# Patient Record
Sex: MALE | Race: WHITE | NOT HISPANIC OR LATINO | Employment: OTHER | URBAN - METROPOLITAN AREA
[De-identification: names, ages, dates, MRNs, and addresses within clinical notes are randomized per-mention and may not be internally consistent; named-entity substitution may affect disease eponyms.]

---

## 2017-03-24 ENCOUNTER — HOSPITAL ENCOUNTER (EMERGENCY)
Facility: HOSPITAL | Age: 82
Discharge: ANOTHER HOSPITAL | End: 2017-03-24
Attending: EMERGENCY MEDICINE
Payer: MEDICARE

## 2017-03-24 VITALS
SYSTOLIC BLOOD PRESSURE: 135 MMHG | OXYGEN SATURATION: 94 % | TEMPERATURE: 98 F | DIASTOLIC BLOOD PRESSURE: 73 MMHG | RESPIRATION RATE: 24 BRPM | HEART RATE: 84 BPM | HEIGHT: 70 IN

## 2017-03-24 DIAGNOSIS — I10 ESSENTIAL HYPERTENSION: ICD-10-CM

## 2017-03-24 DIAGNOSIS — J84.10 PULMONARY FIBROSIS, POSTINFLAMMATORY: ICD-10-CM

## 2017-03-24 DIAGNOSIS — Z95.0 PACEMAKER: ICD-10-CM

## 2017-03-24 DIAGNOSIS — I49.5 SICK SINUS SYNDROME: ICD-10-CM

## 2017-03-24 LAB
ALBUMIN SERPL BCP-MCNC: 4.1 G/DL
ALP SERPL-CCNC: 110 U/L
ALT SERPL W/O P-5'-P-CCNC: 23 U/L
AMYLASE SERPL-CCNC: 88 U/L
ANION GAP SERPL CALC-SCNC: 17 MMOL/L
AST SERPL-CCNC: 29 U/L
BASOPHILS # BLD AUTO: 0.02 K/UL
BASOPHILS NFR BLD: 0.1 %
BILIRUB SERPL-MCNC: 1.4 MG/DL
BUN SERPL-MCNC: 31 MG/DL
CALCIUM SERPL-MCNC: 9.6 MG/DL
CHLORIDE SERPL-SCNC: 103 MMOL/L
CO2 SERPL-SCNC: 16 MMOL/L
CREAT SERPL-MCNC: 1.4 MG/DL
DIFFERENTIAL METHOD: ABNORMAL
EOSINOPHIL # BLD AUTO: 0 K/UL
EOSINOPHIL NFR BLD: 0.1 %
ERYTHROCYTE [DISTWIDTH] IN BLOOD BY AUTOMATED COUNT: 14.8 %
EST. GFR  (AFRICAN AMERICAN): 53 ML/MIN/1.73 M^2
EST. GFR  (NON AFRICAN AMERICAN): 45.8 ML/MIN/1.73 M^2
GLUCOSE SERPL-MCNC: 125 MG/DL
HCT VFR BLD AUTO: 49.7 %
HGB BLD-MCNC: 16.3 G/DL
LIPASE SERPL-CCNC: 31 U/L
LYMPHOCYTES # BLD AUTO: 2.1 K/UL
LYMPHOCYTES NFR BLD: 11.3 %
MCH RBC QN AUTO: 30.8 PG
MCHC RBC AUTO-ENTMCNC: 32.8 %
MCV RBC AUTO: 94 FL
MONOCYTES # BLD AUTO: 1.5 K/UL
MONOCYTES NFR BLD: 8.2 %
NEUTROPHILS # BLD AUTO: 14.6 K/UL
NEUTROPHILS NFR BLD: 80 %
PLATELET # BLD AUTO: 228 K/UL
PMV BLD AUTO: 10 FL
POTASSIUM SERPL-SCNC: 4.6 MMOL/L
PROT SERPL-MCNC: 8.3 G/DL
RBC # BLD AUTO: 5.29 M/UL
SODIUM SERPL-SCNC: 136 MMOL/L
WBC # BLD AUTO: 18.27 K/UL

## 2017-03-24 PROCEDURE — 96365 THER/PROPH/DIAG IV INF INIT: CPT

## 2017-03-24 PROCEDURE — 80053 COMPREHEN METABOLIC PANEL: CPT

## 2017-03-24 PROCEDURE — 25000003 PHARM REV CODE 250: Performed by: EMERGENCY MEDICINE

## 2017-03-24 PROCEDURE — 99900035 HC TECH TIME PER 15 MIN (STAT)

## 2017-03-24 PROCEDURE — 93005 ELECTROCARDIOGRAM TRACING: CPT

## 2017-03-24 PROCEDURE — 82150 ASSAY OF AMYLASE: CPT

## 2017-03-24 PROCEDURE — C9113 INJ PANTOPRAZOLE SODIUM, VIA: HCPCS | Performed by: EMERGENCY MEDICINE

## 2017-03-24 PROCEDURE — 96376 TX/PRO/DX INJ SAME DRUG ADON: CPT

## 2017-03-24 PROCEDURE — 63600175 PHARM REV CODE 636 W HCPCS: Performed by: EMERGENCY MEDICINE

## 2017-03-24 PROCEDURE — 99285 EMERGENCY DEPT VISIT HI MDM: CPT | Mod: 25

## 2017-03-24 PROCEDURE — 85025 COMPLETE CBC W/AUTO DIFF WBC: CPT

## 2017-03-24 PROCEDURE — 82962 GLUCOSE BLOOD TEST: CPT

## 2017-03-24 PROCEDURE — 93010 ELECTROCARDIOGRAM REPORT: CPT | Mod: ,,, | Performed by: INTERNAL MEDICINE

## 2017-03-24 PROCEDURE — 96375 TX/PRO/DX INJ NEW DRUG ADDON: CPT

## 2017-03-24 PROCEDURE — 83690 ASSAY OF LIPASE: CPT

## 2017-03-24 PROCEDURE — 96361 HYDRATE IV INFUSION ADD-ON: CPT

## 2017-03-24 PROCEDURE — 25500020 PHARM REV CODE 255: Performed by: EMERGENCY MEDICINE

## 2017-03-24 RX ORDER — HYDROMORPHONE HYDROCHLORIDE 2 MG/ML
1 INJECTION, SOLUTION INTRAMUSCULAR; INTRAVENOUS; SUBCUTANEOUS ONCE
Status: COMPLETED | OUTPATIENT
Start: 2017-03-24 | End: 2017-03-24

## 2017-03-24 RX ORDER — ONDANSETRON 2 MG/ML
8 INJECTION INTRAMUSCULAR; INTRAVENOUS ONCE
Status: COMPLETED | OUTPATIENT
Start: 2017-03-24 | End: 2017-03-24

## 2017-03-24 RX ORDER — SODIUM CHLORIDE 9 MG/ML
1000 INJECTION, SOLUTION INTRAVENOUS CONTINUOUS
Status: DISCONTINUED | OUTPATIENT
Start: 2017-03-24 | End: 2017-03-24 | Stop reason: HOSPADM

## 2017-03-24 RX ORDER — PANTOPRAZOLE SODIUM 40 MG/10ML
40 INJECTION, POWDER, LYOPHILIZED, FOR SOLUTION INTRAVENOUS
Status: COMPLETED | OUTPATIENT
Start: 2017-03-24 | End: 2017-03-24

## 2017-03-24 RX ORDER — LORATADINE 10 MG/1
10 TABLET ORAL DAILY PRN
COMMUNITY
Start: 2016-05-30 | End: 2017-09-07

## 2017-03-24 RX ADMIN — HYDROMORPHONE HYDROCHLORIDE 1 MG: 2 INJECTION, SOLUTION INTRAMUSCULAR; INTRAVENOUS; SUBCUTANEOUS at 09:03

## 2017-03-24 RX ADMIN — SODIUM CHLORIDE 1000 ML: 0.9 INJECTION, SOLUTION INTRAVENOUS at 07:03

## 2017-03-24 RX ADMIN — HYDROMORPHONE HYDROCHLORIDE 1 MG: 2 INJECTION, SOLUTION INTRAMUSCULAR; INTRAVENOUS; SUBCUTANEOUS at 06:03

## 2017-03-24 RX ADMIN — PANTOPRAZOLE SODIUM 40 MG: 40 INJECTION, POWDER, FOR SOLUTION INTRAVENOUS at 08:03

## 2017-03-24 RX ADMIN — ONDANSETRON 8 MG: 2 INJECTION INTRAMUSCULAR; INTRAVENOUS at 06:03

## 2017-03-24 RX ADMIN — IOHEXOL 75 ML: 350 INJECTION, SOLUTION INTRAVENOUS at 08:03

## 2017-03-24 RX ADMIN — HYDROMORPHONE HYDROCHLORIDE 1 MG: 2 INJECTION, SOLUTION INTRAMUSCULAR; INTRAVENOUS; SUBCUTANEOUS at 07:03

## 2017-03-24 RX ADMIN — SODIUM CHLORIDE 1000 ML: 0.9 INJECTION, SOLUTION INTRAVENOUS at 09:03

## 2017-03-24 RX ADMIN — CEFEPIME 2 G: 2 INJECTION, POWDER, FOR SOLUTION INTRAMUSCULAR; INTRAVENOUS at 08:03

## 2017-03-24 NOTE — ED AVS SNAPSHOT
[facility]  [address] [address] [address]               Viral TREVINO Jordan Wilder   3/24/2017  6:24 PM   ED    Description:  Male : 1933   Department:  [facility]           Your Care was Coordinated By:     Provider Role From To    Dylon Martinez MD Attending Provider [phone] --      Reason for Visit     Abdominal Pain           Diagnoses this Visit        Comments    Accidental esophageal perforation during procedure    -  Primary     Pulmonary fibrosis, postinflammatory         Sick sinus syndrome         Pacemaker         Essential hypertension           ED Disposition     ED Disposition Condition Comment    Transfer to Another Facility  Viral Ortega Jr. should be transferred out to [facility] ED per Dr Amy Ferguson.           To Do List           [facility]     [facility] Care Line -  Assistance  Registered nurses in the [facility] provide clinical advisement, health education, appointment booking, and other advisory services.  Call for this free service at [phone].             Medications           These medications were administered today        Dose Freq    hydromorphone (PF) injection 1 mg 1 mg Once    Sig: Inject 0.5 mLs (1 mg total) into the vein once.    Class: Normal    Route: Intravenous    ondansetron injection 8 mg 8 mg Once    Sig: Inject 8 mg into the vein once.    Class: Normal    Route: Intravenous    sodium chloride 0.9% bolus 1,000 mL 1,000 mL Once    Sig: Inject 1,000 mLs into the vein once.    Class: Normal    Route: Intravenous    sodium chloride 0.9% bolus 1,000 mL 1,000 mL Once    Sig: Inject 1,000 mLs into the vein once.    Class: Normal    Route: Intravenous    hydromorphone (PF) injection 1 mg 1 mg Once    Sig: Inject 0.5 mLs (1 mg total) into the vein once.    Class: Normal    Route: Intravenous    omnipaque 350 iohexol 75 mL 75 mL IMG once as needed    Sig: Inject 75 mLs into the vein  ONCE PRN for contrast.    Class: Normal    Route: Intravenous    pantoprazole injection 40 mg 40 mg ED 1 Time    Sig: Inject 40 mg into the vein ED 1 Time.    Class: Normal    Route: Intravenous    cefepime 2 g in dextrose 5% 50 mL IVPB (ready to mix system) 2 g ED 1 Time    Sig: Inject 50 mLs (2 g total) into the vein ED 1 Time.    Class: Normal    Route: Intravenous      STOP taking these medications     fluocinonide (LIDEX) 0.05 % gel Apply  topically 2 (two) times daily.    metoprolol succinate (TOPROL-XL) 50 MG 24 hr tablet Take 25 mg by mouth once daily.    ketoconazole (NIZORAL) 2 % shampoo     iron &iron asp gly-FA-mv,min27 125 mg iron-25 mg iron-1 mg Tab 1 tablet.    clobetasol-emollient 0.05 % Crea     clobetasol (TEMOVATE) 0.05 % external solution     polyethylene glycol (GLYCOLAX) 17 gram/dose powder USE AS DIRECTED FOR COLONOSCOPY BOWEL PREP           Verify that the below list of medications is an accurate representation of the medications you are currently taking.  If none reported, the list may be blank. If incorrect, please contact your healthcare provider. Carry this list with you in case of emergency.           Current Medications     apixaban (ELIQUIS) 2.5 mg Tab Take 2.5 mg by mouth 2 (two) times daily.    flecainide (TAMBOCOR) 50 MG Tab Take 100 mg by mouth every 12 (twelve) hours.    gabapentin (NEURONTIN) 100 MG capsule Take 800 mg by mouth 3 (three) times daily.     ipratropium (ATROVENT) 0.06 % nasal spray 2 sprays by Nasal route 4 (four) times daily. As needed for rhinitis    loratadine (CLARITIN) 10 mg tablet Take 10 mg by mouth daily as needed.    midodrine (PROAMATINE) 5 MG Tab 10 mg.     spironolactone (ALDACTONE) 25 MG tablet Take 25 mg by mouth once daily.    tamsulosin (FLOMAX) 0.4 mg Cp24 Take 0.4 mg by mouth once daily.    cefepime 2 g in dextrose 5% 50 mL IVPB (ready to mix system) Inject 50 mLs (2 g total) into the vein ED 1 Time.           Clinical Reference Information          "  Your Vitals Were     BP Pulse Temp Resp Height SpO2    145/68 (BP Location: Right arm, Patient Position: Lying, BP Method: Automatic) 84 97.9 °F (36.6 °C) (Oral) 22 5' 10" (1.778 m) 92%      Allergies as of 3/24/2017        Reactions    Amoxicillin-pot Clavulanate     GI upset    Codeine     Diphenhydramine Hcl     hyper    Hydrocodone     Oxycodone-acetaminophen     Propoxyphene N-acetaminophen       Immunizations Administered on Date of Encounter - 3/24/2017     None      ED Micro, Lab, POCT     Start Ordered       Status Ordering Provider    03/24/17 1835 03/24/17 1834  Amylase  STAT      Final result     03/24/17 1835 03/24/17 1834  CBC auto differential  STAT      Final result     03/24/17 1835 03/24/17 1834  Comprehensive metabolic panel  STAT      Final result     03/24/17 1835 03/24/17 1834  Lipase  STAT      Final result       ED Imaging Orders     Start Ordered       Status Ordering Provider    03/24/17 1926 03/24/17 1925  CT Chest With Contrast  1 time imaging      Final result     03/24/17 1926 03/24/17 1925  CT Abdomen Pelvis With Contrast  1 time imaging      Final result     03/24/17 1924 03/24/17 1923    1 time imaging,   Status:  Canceled      Canceled     03/24/17 1832 03/24/17 1833  X-Ray Abdomen Flat And Erect  1 time imaging      Final result     03/24/17 1832 03/24/17 1833  X-Ray Chest AP Portable  1 time imaging      Final result       Your Scheduled Appointments     Sep 07, 2017  8:45 AM CDT   Diagnostic Xray with [facility] XR2   [facility] ([facility])    [address] [facility] [place] [place] [id number] [phone]            Sep 07, 2017  9:00 AM CDT   Complete PFT with PULMONARY LAB, [facility] ([facility])    [address] [place] [id number] [phone]            Sep 07, 2017  9:40 AM CDT   Established Patient Visit with Narinder Tubbs MD   [facility] ([facility])    [address] [place] [id number] [phone]            "   [facility] Sign-Up     Activating your [facility] account is as easy as 1-2-3!     1) Visit [email or web address], select Sign Up Now, enter this activation code and your date of birth, then select Next.  [email or web address]  Expires: 5/8/2017  8:58 PM      2) Create a username and password to use when you visit [facility] in the future and select a security question in case you lose your password and select Next.    3) Enter your e-mail address and click Sign Up!    Additional Information  If you have questions, please e-mail [email or web address] or call [phone] to talk to our [facility] staff. Remember, [facility] is NOT to be used for urgent needs. For medical emergencies, dial 911.          [facility] complies with applicable Federal civil rights laws and does not discriminate on the basis of race, color, national origin, age, disability, or sex.        Language Assistance Services     ATTENTION: Language assistance services are available, free of charge. Please call [phone].      ATENCIÓN: Si habla español, tiene a santos disposición servicios gratuitos de asistencia lingüística. Llame al [phone].     CHÚ Ý: N?u b?n nói Ti?ng Vi?t, có các d?ch v? h? tr? ngôn ng? mi?n phí dành cho b?n. G?i s? [phone].

## 2017-03-25 NOTE — ED NOTES
Dr. Martinez at bedside discussing results of diagnostics & the need for admission/transfer to another facility at this time.

## 2017-03-25 NOTE — ED NOTES
Dr. Martinez aware of pt's vital signs at time of ambulance arrival & states ok for pt transport to [facility].

## 2017-03-25 NOTE — ED NOTES
[facility] [facility] tonight. Phone call made to [facility] house supervisor to attempt to get pt accepted there.

## 2017-03-25 NOTE — ED NOTES
On the phone with Alexandro from [facility] about transferring the pt to [facility].

## 2017-03-25 NOTE — ED PROVIDER NOTES
Encounter Date: 3/24/2017       History     Chief Complaint   Patient presents with    Abdominal Pain     Pt reports having EGD done this AM ~11. Home @ 12:30 PM. Pain began ~3pm.      Review of patient's allergies indicates:   Allergen Reactions    Amoxicillin-pot clavulanate      GI upset    Codeine     Diphenhydramine hcl      hyper    Hydrocodone     Oxycodone-acetaminophen     Propoxyphene n-acetaminophen      HPI Comments: Patient currently presents with complaint of abdominal pain.  Onset of this event was first noted 3-  4 hours ago.  Patient underwent EGD earlier today at [facility] per Dr Wellington.  Patient did undergo dilation of an apparent Schatsky's ring per the documents provided.  Pain is currently localized to the epigastric region.  This discomfort is described as sharp and constant in nature.  There are not associated changes in bowel habits.  There are not associated urinary complaints.  This is not a recurring problem.  Patient denies fever.        The history is provided by the patient.     Past Medical History:   Diagnosis Date    Anemia     Anticoagulant long-term use     BPH (benign prostatic hyperplasia)     Cancer     Non-hodgkins lymphoma    Colitis     DVT (deep venous thrombosis)     GERD (gastroesophageal reflux disease)     Hypertension     Orthostatic hypotension dysautonomic syndrome     Peripheral neuropathy     Shingles     Sick sinus syndrome      Past Surgical History:   Procedure Laterality Date    APPENDECTOMY      CARDIAC PACEMAKER PLACEMENT      CATARACT EXTRACTION      COLONOSCOPY      STEFANO FILTER PLACEMENT  Jan . 2015    INGUINAL HERNIA REPAIR Right     KNEE SURGERY Right     MEDIPORT INSERTION, DOUBLE      TONSILLECTOMY       History reviewed. No pertinent family history.  Social History   Substance Use Topics    Smoking status: Never Smoker    Smokeless tobacco: Never Used    Alcohol use No     Review of Systems    Constitutional: Negative for chills and fever.   HENT: Negative for congestion.    Respiratory: Negative for chest tightness and shortness of breath.    Cardiovascular: Negative for chest pain and leg swelling.   Gastrointestinal: Positive for abdominal distention, abdominal pain and nausea. Negative for constipation, diarrhea and vomiting.   Genitourinary: Negative for dysuria, frequency and urgency.   Skin: Negative for color change and rash.   Allergic/Immunologic: Negative for immunocompromised state.   Neurological: Negative for weakness and numbness.   Hematological: Negative for adenopathy. Does not bruise/bleed easily.   All other systems reviewed and are negative.      Physical Exam   Initial Vitals   BP Pulse Resp Temp SpO2   03/24/17 1830 03/24/17 1830 03/24/17 1830 03/24/17 1830 03/24/17 1830   134/76 101 23 97.9 °F (36.6 °C) 99 %     Physical Exam    Nursing note and vitals reviewed.  Constitutional: He appears well-developed and well-nourished. He is not diaphoretic. No distress.   HENT:   Head: Normocephalic and atraumatic.   Right Ear: External ear normal.   Left Ear: External ear normal.   Nose: Nose normal.   Mouth/Throat: Oropharynx is clear and moist.   Eyes: Conjunctivae and EOM are normal. Pupils are equal, round, and reactive to light. No scleral icterus.   Neck: Neck supple. No JVD present.   Cardiovascular: Normal rate, regular rhythm, normal heart sounds and intact distal pulses. Exam reveals no gallop and no friction rub.    No murmur heard.  Pulmonary/Chest: Breath sounds normal. No respiratory distress. He has no wheezes. He has no rhonchi. He has no rales.   Abdominal: Soft. Bowel sounds are normal. He exhibits distension. There is tenderness. There is guarding.   Musculoskeletal: Normal range of motion. He exhibits no edema.   Neurological: He is alert and oriented to person, place, and time. He has normal strength. No cranial nerve deficit or sensory deficit.   Skin: Skin is warm  and dry. No rash noted.   Psychiatric: He has a normal mood and affect. His behavior is normal.         ED Course   Procedures  Labs Reviewed   CBC W/ AUTO DIFFERENTIAL - Abnormal; Notable for the following:        Result Value    WBC 18.27 (*)     RDW 14.8 (*)     Gran # 14.6 (*)     Mono # 1.5 (*)     Gran% 80.0 (*)     Lymph% 11.3 (*)     All other components within normal limits   COMPREHENSIVE METABOLIC PANEL - Abnormal; Notable for the following:     CO2 16 (*)     Glucose 125 (*)     BUN, Bld 31 (*)     Total Bilirubin 1.4 (*)     Anion Gap 17 (*)     eGFR if  53.0 (*)     eGFR if non  45.8 (*)     All other components within normal limits   AMYLASE   LIPASE     EKG Readings: (Independently Interpreted)   EKG timed 1839 demonstrates an atrial paced rhythm with Q waves noted in the inferior leads suggestive of prior infarction.  No ectopy is seen.  I see no significant ST or T-wave changes.          Medical Decision Making:   Initial Assessment:   On initial inspection, the patient demonstrates signs of peritonitis area given the recent procedure, our obvious concerns are for esophageal perforation following the dilation earlier today.  We will initiate IV fluids and broad-spectrum antibiotics pending imaging results.  Independently Interpreted Test(s):   I have ordered and independently interpreted EKG Reading(s) - see prior notes  Clinical Tests:   Lab Tests: Ordered and Reviewed  Radiological Study: Ordered and Reviewed  ED Management:  All historical, clinical, radiographic, and laboratory findings were reviewed with the patient/family in detail along with the indications for transfer to an outside facility (rather than admission to our facility in [place]) secondary to a need for thoracic surgery services.  All remaining questions and concerns were addressed at that time and the patient/family communicates understanding and agrees to proceed accordingly.  Similarly all  pertinent details of the encounter were discussed with Dr Amy Ferguson via House Supervisor Alexandro at [facility] ED who agrees to accept the patient in transfer based on the needs/patient preferences outlined above.  Patient will be transferred by [facility] services secondary to a need for ongoing IVF and ABX en route.  [facility] dispatch has been notified per charge nurse Beartiz at exactly 2100.  Dylon Martinez MD  8:54 PM                     ED Course     Clinical Impression:   The primary encounter diagnosis was Accidental esophageal perforation during procedure. Diagnoses of Pulmonary fibrosis, postinflammatory, Sick sinus syndrome, Pacemaker, and Essential hypertension were also pertinent to this visit.          Dylon Martinez MD  03/24/17 2100

## 2017-03-25 NOTE — ED NOTES
Received report from JAMES Mcdaniel. Assumed care of this pt at this time. Pt continues to thrash around on stretcher, moaning in pain that he describes as excrutiating to his abdomen. He states minimal relief from pain med. Still 10/10 on pain scale. Dr. Martinez aware of this & vital signs.

## 2017-08-18 ENCOUNTER — LAB VISIT (OUTPATIENT)
Dept: LAB | Facility: HOSPITAL | Age: 82
End: 2017-08-18
Attending: FAMILY MEDICINE
Payer: MEDICARE

## 2017-08-18 DIAGNOSIS — I73.9 PAD (PERIPHERAL ARTERY DISEASE): ICD-10-CM

## 2017-08-18 DIAGNOSIS — K52.9 IBD (INFLAMMATORY BOWEL DISEASE): ICD-10-CM

## 2017-08-18 DIAGNOSIS — I73.9 PERIPHERAL VASCULAR DISEASE, UNSPECIFIED: ICD-10-CM

## 2017-08-18 DIAGNOSIS — I26.02 SADDLE EMBOLUS OF PULMONARY ARTERY WITH ACUTE COR PULMONALE: ICD-10-CM

## 2017-08-18 DIAGNOSIS — Z00.00 ANNUAL PHYSICAL EXAM: ICD-10-CM

## 2017-08-18 DIAGNOSIS — Z12.5 SPECIAL SCREENING FOR MALIGNANT NEOPLASM OF PROSTATE: ICD-10-CM

## 2017-08-18 DIAGNOSIS — K57.30 DIVERTICULOSIS OF LARGE INTESTINE WITHOUT HEMORRHAGE: ICD-10-CM

## 2017-08-18 DIAGNOSIS — K52.9 INFLAMMATORY BOWEL DISEASES (IBD): ICD-10-CM

## 2017-08-18 DIAGNOSIS — I47.10 PSVT (PAROXYSMAL SUPRAVENTRICULAR TACHYCARDIA): ICD-10-CM

## 2017-08-18 DIAGNOSIS — Z00.00 ROUTINE GENERAL MEDICAL EXAMINATION AT A HEALTH CARE FACILITY: ICD-10-CM

## 2017-08-18 DIAGNOSIS — I10 HTN (HYPERTENSION): ICD-10-CM

## 2017-08-18 DIAGNOSIS — Z00.00 ROUTINE GENERAL MEDICAL EXAMINATION AT A HEALTH CARE FACILITY: Primary | ICD-10-CM

## 2017-08-18 LAB
ALBUMIN SERPL BCP-MCNC: 3.7 G/DL
ALP SERPL-CCNC: 119 U/L
ALT SERPL W/O P-5'-P-CCNC: 18 U/L
ANION GAP SERPL CALC-SCNC: 9 MMOL/L
AST SERPL-CCNC: 21 U/L
BASOPHILS # BLD AUTO: 0.04 K/UL
BASOPHILS NFR BLD: 0.6 %
BILIRUB SERPL-MCNC: 0.9 MG/DL
BUN SERPL-MCNC: 30 MG/DL
CALCIUM SERPL-MCNC: 9.4 MG/DL
CHLORIDE SERPL-SCNC: 108 MMOL/L
CHOLEST/HDLC SERPL: 2.9 {RATIO}
CO2 SERPL-SCNC: 23 MMOL/L
COMPLEXED PSA SERPL-MCNC: 5.3 NG/ML
CREAT SERPL-MCNC: 1.3 MG/DL
DIFFERENTIAL METHOD: ABNORMAL
EOSINOPHIL # BLD AUTO: 0.2 K/UL
EOSINOPHIL NFR BLD: 2.6 %
ERYTHROCYTE [DISTWIDTH] IN BLOOD BY AUTOMATED COUNT: 15.3 %
EST. GFR  (AFRICAN AMERICAN): 57.9 ML/MIN/1.73 M^2
EST. GFR  (NON AFRICAN AMERICAN): 50.1 ML/MIN/1.73 M^2
ESTIMATED AVG GLUCOSE: 105 MG/DL
GLUCOSE SERPL-MCNC: 80 MG/DL
HBA1C MFR BLD HPLC: 5.3 %
HCT VFR BLD AUTO: 44.1 %
HDL/CHOLESTEROL RATIO: 34.9 %
HDLC SERPL-MCNC: 152 MG/DL
HDLC SERPL-MCNC: 53 MG/DL
HGB BLD-MCNC: 14.8 G/DL
IRON SERPL-MCNC: 94 UG/DL
LDLC SERPL CALC-MCNC: 86.4 MG/DL
LYMPHOCYTES # BLD AUTO: 3.1 K/UL
LYMPHOCYTES NFR BLD: 43.3 %
MAGNESIUM SERPL-MCNC: 2.2 MG/DL
MCH RBC QN AUTO: 31.4 PG
MCHC RBC AUTO-ENTMCNC: 33.6 G/DL
MCV RBC AUTO: 93 FL
MONOCYTES # BLD AUTO: 0.9 K/UL
MONOCYTES NFR BLD: 12.6 %
NEUTROPHILS # BLD AUTO: 2.9 K/UL
NEUTROPHILS NFR BLD: 40.6 %
NONHDLC SERPL-MCNC: 99 MG/DL
PLATELET # BLD AUTO: 186 K/UL
PMV BLD AUTO: 10.1 FL
POTASSIUM SERPL-SCNC: 4.6 MMOL/L
PROT SERPL-MCNC: 7.8 G/DL
RBC # BLD AUTO: 4.72 M/UL
SODIUM SERPL-SCNC: 140 MMOL/L
TRIGL SERPL-MCNC: 63 MG/DL
TSH SERPL DL<=0.005 MIU/L-ACNC: 3.94 UIU/ML
WBC # BLD AUTO: 7.23 K/UL

## 2017-08-18 PROCEDURE — 84443 ASSAY THYROID STIM HORMONE: CPT | Mod: PO

## 2017-08-18 PROCEDURE — 83735 ASSAY OF MAGNESIUM: CPT | Mod: PO

## 2017-08-18 PROCEDURE — 36415 COLL VENOUS BLD VENIPUNCTURE: CPT | Mod: PO

## 2017-08-18 PROCEDURE — 85025 COMPLETE CBC W/AUTO DIFF WBC: CPT | Mod: PO

## 2017-08-18 PROCEDURE — 83540 ASSAY OF IRON: CPT

## 2017-08-18 PROCEDURE — 80061 LIPID PANEL: CPT

## 2017-08-18 PROCEDURE — 80053 COMPREHEN METABOLIC PANEL: CPT | Mod: PO

## 2017-08-18 PROCEDURE — 84153 ASSAY OF PSA TOTAL: CPT

## 2017-08-18 PROCEDURE — 83036 HEMOGLOBIN GLYCOSYLATED A1C: CPT

## 2017-09-07 ENCOUNTER — OFFICE VISIT (OUTPATIENT)
Dept: PULMONOLOGY | Facility: CLINIC | Age: 82
End: 2017-09-07
Payer: MEDICARE

## 2017-09-07 ENCOUNTER — HOSPITAL ENCOUNTER (OUTPATIENT)
Dept: RADIOLOGY | Facility: HOSPITAL | Age: 82
Discharge: HOME | End: 2017-09-07
Attending: INTERNAL MEDICINE
Payer: MEDICARE

## 2017-09-07 ENCOUNTER — PROCEDURE VISIT (OUTPATIENT)
Dept: PULMONOLOGY | Facility: CLINIC | Age: 82
End: 2017-09-07
Payer: MEDICARE

## 2017-09-07 VITALS
SYSTOLIC BLOOD PRESSURE: 118 MMHG | HEIGHT: 69 IN | WEIGHT: 176 LBS | HEART RATE: 61 BPM | DIASTOLIC BLOOD PRESSURE: 66 MMHG | OXYGEN SATURATION: 99 % | BODY MASS INDEX: 26.07 KG/M2

## 2017-09-07 DIAGNOSIS — I26.99 OTHER PULMONARY EMBOLISM WITHOUT ACUTE COR PULMONALE, UNSPECIFIED CHRONICITY: ICD-10-CM

## 2017-09-07 DIAGNOSIS — J84.10 PULMONARY FIBROSIS, POSTINFLAMMATORY: ICD-10-CM

## 2017-09-07 DIAGNOSIS — I87.2 CHRONIC VENOUS INSUFFICIENCY: ICD-10-CM

## 2017-09-07 DIAGNOSIS — I27.82 OTHER CHRONIC PULMONARY EMBOLISM WITHOUT ACUTE COR PULMONALE: Primary | ICD-10-CM

## 2017-09-07 PROBLEM — Z95.0 PRESENCE OF CARDIAC PACEMAKER: Status: ACTIVE | Noted: 2017-03-28

## 2017-09-07 PROBLEM — E87.5 HYPERKALEMIA: Status: ACTIVE | Noted: 2017-03-26

## 2017-09-07 PROBLEM — K22.3 ESOPHAGEAL RUPTURE: Status: ACTIVE | Noted: 2017-03-26

## 2017-09-07 PROBLEM — N64.4 BREAST PAIN: Status: ACTIVE | Noted: 2017-03-01

## 2017-09-07 PROBLEM — R06.02 SHORTNESS OF BREATH: Status: ACTIVE | Noted: 2017-03-30

## 2017-09-07 PROBLEM — I47.19 ATRIAL PAROXYSMAL TACHYCARDIA: Status: ACTIVE | Noted: 2017-03-28

## 2017-09-07 PROBLEM — N18.30 CKD (CHRONIC KIDNEY DISEASE) STAGE 3, GFR 30-59 ML/MIN: Status: ACTIVE | Noted: 2017-03-26

## 2017-09-07 PROCEDURE — 94060 EVALUATION OF WHEEZING: CPT | Mod: S$GLB,,, | Performed by: INTERNAL MEDICINE

## 2017-09-07 PROCEDURE — 1159F MED LIST DOCD IN RCRD: CPT | Mod: S$GLB,,, | Performed by: INTERNAL MEDICINE

## 2017-09-07 PROCEDURE — 71030 XR CHEST 4 OR MORE VIEW: CPT | Mod: 26,,, | Performed by: RADIOLOGY

## 2017-09-07 PROCEDURE — 3074F SYST BP LT 130 MM HG: CPT | Mod: S$GLB,,, | Performed by: INTERNAL MEDICINE

## 2017-09-07 PROCEDURE — 1126F AMNT PAIN NOTED NONE PRSNT: CPT | Mod: S$GLB,,, | Performed by: INTERNAL MEDICINE

## 2017-09-07 PROCEDURE — 94729 DIFFUSING CAPACITY: CPT | Mod: S$GLB,,, | Performed by: INTERNAL MEDICINE

## 2017-09-07 PROCEDURE — 99215 OFFICE O/P EST HI 40 MIN: CPT | Mod: 25,S$GLB,, | Performed by: INTERNAL MEDICINE

## 2017-09-07 PROCEDURE — 3078F DIAST BP <80 MM HG: CPT | Mod: S$GLB,,, | Performed by: INTERNAL MEDICINE

## 2017-09-07 PROCEDURE — 99999 PR PBB SHADOW E&M-EST. PATIENT-LVL III: CPT | Mod: PBBFAC,,, | Performed by: INTERNAL MEDICINE

## 2017-09-07 PROCEDURE — 94727 GAS DIL/WSHOT DETER LNG VOL: CPT | Mod: S$GLB,,, | Performed by: INTERNAL MEDICINE

## 2017-09-07 PROCEDURE — 3008F BODY MASS INDEX DOCD: CPT | Mod: S$GLB,,, | Performed by: INTERNAL MEDICINE

## 2017-09-07 PROCEDURE — 71030 XR CHEST 4 OR MORE VIEW: CPT | Mod: TC,PO

## 2017-09-07 RX ORDER — ALPRAZOLAM 0.5 MG/1
0.5 TABLET ORAL
COMMUNITY
Start: 2017-05-03 | End: 2017-12-14

## 2017-09-07 NOTE — PROGRESS NOTES
Subjective:       Patient ID: Viral Ortega Jr. is a 84 y.o. male.    Chief Complaint: He       No chief complaint on file.    HPI     Pulmonary Fibrosis lower lobes:     Hx of Pulmonary Embolism at [facility] on blood thinners - [facility]  Review of medical records indicates that he had pulmonary edema.  No CTA of chest is included in records  Hx of stefano filter  Patient is concerned about status of blood clots in lung.     Dyspnea  Patient complains of shortness of breath. Symptoms occur while getting dressed, with one block walking. Symptoms began 7 months ago, unchanged since. Associated symptoms include difficulty breathing, drainage from nose, dry cough, dyspnea on exertion, post nasal drip and shortness of breath. He denies chest pain, located left chest. He does not have had recent travel. Weight has been stable. Symptoms are exacerbated by any exercise. Symptoms are alleviated by rest.    Hx of rhinitis  Smoked for 5 years - cigars and pipe      Past Medical History:   Diagnosis Date    Anemia     Anticoagulant long-term use     BPH (benign prostatic hyperplasia)     Cancer     Non-hodgkins lymphoma    Colitis     DVT (deep venous thrombosis)     GERD (gastroesophageal reflux disease)     Hypertension     Orthostatic hypotension dysautonomic syndrome     Peripheral neuropathy     Shingles     Sick sinus syndrome      Past Surgical History:   Procedure Laterality Date    APPENDECTOMY      CARDIAC PACEMAKER PLACEMENT      CATARACT EXTRACTION      COLONOSCOPY      STEFANO FILTER PLACEMENT  Jan . 2015    INGUINAL HERNIA REPAIR Right     KNEE SURGERY Right     MEDIPORT INSERTION, DOUBLE      TONSILLECTOMY       Social History     Social History    Marital status:      Spouse name: N/A    Number of children: N/A    Years of education: N/A     Occupational History    Not on file.     Social History Main Topics    Smoking status: Never Smoker    Smokeless  tobacco: Never Used    Alcohol use No    Drug use: No    Sexual activity: No     Other Topics Concern    Not on file     Social History Narrative         Review of Systems   Constitutional: Positive for fatigue.   Respiratory: Positive for dyspnea on extertion.    Musculoskeletal: Positive for arthralgias.   Psychiatric/Behavioral: The patient is nervous/anxious.        Objective:      Physical Exam   Constitutional: He is oriented to person, place, and time. He appears well-developed and well-nourished.   HENT:   Head: Normocephalic and atraumatic.   Eyes: Conjunctivae are normal. Pupils are equal, round, and reactive to light.   Neck: Neck supple. No JVD present. No tracheal deviation present. No thyromegaly present.   Cardiovascular: Normal rate and normal heart sounds.  A regularly irregular rhythm present.   Pulmonary/Chest: Effort normal. He has decreased breath sounds in the right lower field. He has rales in the right lower field and the left lower field.   Abdominal: Soft.   Musculoskeletal: He exhibits edema and deformity.   Decreased range of motion of knees.     Lymphadenopathy:     He has no cervical adenopathy.   Neurological: He is alert and oriented to person, place, and time.   Skin: Skin is warm and dry.   Nursing note and vitals reviewed.    Personal Diagnostic Review  Chest x-ray: stable  Pulmonary function tests: normal    No flowsheet data found.      Assessment:       1. Other chronic pulmonary embolism without acute cor pulmonale    2. Chronic venous insufficiency        Outpatient Encounter Prescriptions as of 9/7/2017   Medication Sig Dispense Refill    alprazolam (XANAX) 0.5 MG tablet Take 0.5 mg by mouth.      apixaban (ELIQUIS) 2.5 mg Tab Take 2.5 mg by mouth 2 (two) times daily.      flecainide (TAMBOCOR) 50 MG Tab Take 100 mg by mouth every 12 (twelve) hours.      gabapentin (NEURONTIN) 100 MG capsule Take 800 mg by mouth 3 (three) times daily.       ipratropium  (ATROVENT) 0.06 % nasal spray 2 sprays by Nasal route 4 (four) times daily. As needed for rhinitis 15 mL 11    loratadine (CLARITIN) 10 mg tablet Take 10 mg by mouth daily as needed.      midodrine (PROAMATINE) 5 MG Tab 10 mg.       spironolactone (ALDACTONE) 25 MG tablet Take 25 mg by mouth once daily.      tamsulosin (FLOMAX) 0.4 mg Cp24 Take 0.4 mg by mouth once daily.       No facility-administered encounter medications on file as of 9/7/2017.      Orders Placed This Encounter   Procedures    X-Ray Chest PA And Lateral     Standing Status:   Future     Standing Expiration Date:   3/7/2019     Order Specific Question:   Reason for Exam:     Answer:   SOB    Complete PFT with bronchodilator     Standing Status:   Future     Standing Expiration Date:   3/7/2019     Plan:       Requested Prescriptions      No prescriptions requested or ordered in this encounter     Other chronic pulmonary embolism without acute cor pulmonale  -     Complete PFT with bronchodilator; Future; Expected date: 09/07/2017  -     X-Ray Chest PA And Lateral; Future; Expected date: 09/07/2017    Chronic venous insufficiency           Return in about 1 year (around 9/7/2018) for pft and CXR.    MEDICAL DECISION MAKING: Moderate to high complexity.  Overall, the multiple problems listed are of moderate to high severity that may impact quality of life and activities of daily living. Side effects of medications, treatment plan as well as options and alternatives reviewed and discussed with patient. There was counseling of patient concerning these issues.    Total time spent in face to face counseling and coordination of care - 25  minutes over 50% of time was used in discussion of prognosis, risks, benefits of treatment, instructions and compliance with regimen . Discussion with other physicians or health care providers (DME, NP, pharmacy, respiratory therapy) occurred.

## 2017-09-07 NOTE — LETTER
September 7, 2017      Narinder Rivera MD  [address] [place] [id number]           [facility]  [address] [place] [address]  Phone: [phone]  Fax: [phone]          Patient: Viral Ortega Jr.   MR Number: [id number]   YOB: 1933   Date of Visit: 9/7/2017       Dear Dr. Narinder Tubbs:    Thank you for referring Viral Ortega to me for evaluation. Attached you will find relevant portions of my assessment and plan of care.    If you have questions, please do not hesitate to call me. I look forward to following Viral Ortega along with you.    Sincerely,    Narinder Tubbs MD    Enclosure  CC:  Keven Mondragon MD    IIf you would like to receive this communication electronically, please contact [email or web address] or [phone] to request [employer] access.    [employer] is a tool which provides read-only access to select patient information with whom you have a relationship. Its easy to use and provides real time access to review your patients record including encounter summaries, notes, results, and demographic information.    If you feel you have received this communication in error or would no longer like to receive these types of communications, please e-mail [email or web address]

## 2017-09-08 PROBLEM — I87.2 CHRONIC VENOUS INSUFFICIENCY: Status: ACTIVE | Noted: 2017-09-08

## 2017-09-13 LAB
POST FEF 25 75: 1.25 L/S (ref 0.9–2.45)
POST FET 100: 14.74 S
POST FEV1 FVC: 67 %
POST FEV1: 2.66 L (ref 2.18–2.94)
POST FIF 50: 3.87 L/S
POST FVC: 3.97 L (ref 3.22–4.12)
POST PEF: 8.4 L/S (ref 5.34–7.59)
PRE DLCO: 12.59 ML/MMHG/MIN (ref 12.93–21.22)
PRE ERV: 0.63 L
PRE FEF 25 75: 1.37 L/S (ref 0.9–2.45)
PRE FET 100: 9.51 S
PRE FEV1 FVC: 68 %
PRE FEV1: 2.56 L (ref 2.18–2.94)
PRE FIF 50: 3.84 L/S
PRE FRC PL: 3.62 L (ref 3–4.21)
PRE FVC: 3.77 L (ref 3.22–4.12)
PRE KROGHS K: 1.8 1/MIN
PRE PEF: 7.58 L/S (ref 5.34–7.59)
PRE RV: 2.34 L (ref 2.32–3.11)
PRE SVC: 3.9 L
PRE TLC: 6.24 L (ref 5.56–6.56)
PREDICTED DLCO: 17.08 ML/MMHG/MIN (ref 12.93–21.22)
PREDICTED FEV1 FVC: 70.71 % (ref 65.87–75.55)
PREDICTED FEV1: 2.56 L (ref 2.18–2.94)
PREDICTED FRC N2: 3.6 L (ref 3–4.21)
PREDICTED FRC PL: 3.6 L (ref 3–4.21)
PREDICTED FVC: 3.67 L (ref 3.22–4.12)
PREDICTED RV: 2.71 L (ref 2.32–3.11)
PREDICTED SVC: 3.48 L
PREDICTED TLC: 6.06 L (ref 5.56–6.56)
PROVOCATION PROTOCOL: ABNORMAL

## 2017-12-11 ENCOUNTER — HOSPITAL ENCOUNTER (EMERGENCY)
Facility: HOSPITAL | Age: 82
Discharge: LEFT BEFORE DISCHARGE | End: 2017-12-11
Attending: EMERGENCY MEDICINE
Payer: MEDICARE

## 2017-12-11 VITALS
OXYGEN SATURATION: 95 % | TEMPERATURE: 98 F | BODY MASS INDEX: 27.17 KG/M2 | DIASTOLIC BLOOD PRESSURE: 74 MMHG | WEIGHT: 184 LBS | RESPIRATION RATE: 20 BRPM | SYSTOLIC BLOOD PRESSURE: 121 MMHG | HEART RATE: 72 BPM

## 2017-12-11 DIAGNOSIS — S09.90XA CLOSED HEAD INJURY, INITIAL ENCOUNTER: ICD-10-CM

## 2017-12-11 DIAGNOSIS — R07.9 CHEST PAIN: ICD-10-CM

## 2017-12-11 DIAGNOSIS — R55 SYNCOPE, UNSPECIFIED SYNCOPE TYPE: Primary | ICD-10-CM

## 2017-12-11 DIAGNOSIS — E87.5 HYPERKALEMIA: ICD-10-CM

## 2017-12-11 DIAGNOSIS — S01.01XA LACERATION OF SCALP, INITIAL ENCOUNTER: ICD-10-CM

## 2017-12-11 LAB
ALBUMIN SERPL BCP-MCNC: 3.1 G/DL
ALP SERPL-CCNC: 118 U/L
ALT SERPL W/O P-5'-P-CCNC: 19 U/L
ANION GAP SERPL CALC-SCNC: 8 MMOL/L
AST SERPL-CCNC: 21 U/L
BASOPHILS # BLD AUTO: 0.03 K/UL
BASOPHILS NFR BLD: 0.3 %
BILIRUB SERPL-MCNC: 0.7 MG/DL
BUN SERPL-MCNC: 31 MG/DL
CALCIUM SERPL-MCNC: 8.9 MG/DL
CHLORIDE SERPL-SCNC: 110 MMOL/L
CO2 SERPL-SCNC: 21 MMOL/L
CREAT SERPL-MCNC: 1.3 MG/DL
DIFFERENTIAL METHOD: ABNORMAL
EOSINOPHIL # BLD AUTO: 0.1 K/UL
EOSINOPHIL NFR BLD: 1.1 %
ERYTHROCYTE [DISTWIDTH] IN BLOOD BY AUTOMATED COUNT: 16 %
EST. GFR  (AFRICAN AMERICAN): 57.9 ML/MIN/1.73 M^2
EST. GFR  (NON AFRICAN AMERICAN): 50.1 ML/MIN/1.73 M^2
GLUCOSE SERPL-MCNC: 85 MG/DL
HCT VFR BLD AUTO: 42.3 %
HGB BLD-MCNC: 14 G/DL
LYMPHOCYTES # BLD AUTO: 1.9 K/UL
LYMPHOCYTES NFR BLD: 20.8 %
MCH RBC QN AUTO: 31.3 PG
MCHC RBC AUTO-ENTMCNC: 33.1 G/DL
MCV RBC AUTO: 94 FL
MONOCYTES # BLD AUTO: 1.1 K/UL
MONOCYTES NFR BLD: 12.3 %
NEUTROPHILS # BLD AUTO: 6 K/UL
NEUTROPHILS NFR BLD: 65.2 %
PLATELET # BLD AUTO: 174 K/UL
PMV BLD AUTO: 10.8 FL
POTASSIUM SERPL-SCNC: 5.9 MMOL/L
PROT SERPL-MCNC: 7 G/DL
RBC # BLD AUTO: 4.48 M/UL
SODIUM SERPL-SCNC: 139 MMOL/L
TROPONIN I SERPL DL<=0.01 NG/ML-MCNC: 0.01 NG/ML
WBC # BLD AUTO: 9.27 K/UL

## 2017-12-11 PROCEDURE — 90715 TDAP VACCINE 7 YRS/> IM: CPT | Performed by: EMERGENCY MEDICINE

## 2017-12-11 PROCEDURE — 99285 EMERGENCY DEPT VISIT HI MDM: CPT

## 2017-12-11 PROCEDURE — 63600175 PHARM REV CODE 636 W HCPCS: Performed by: EMERGENCY MEDICINE

## 2017-12-11 PROCEDURE — 80053 COMPREHEN METABOLIC PANEL: CPT

## 2017-12-11 PROCEDURE — 93005 ELECTROCARDIOGRAM TRACING: CPT | Performed by: GENERAL PRACTICE

## 2017-12-11 PROCEDURE — 25000003 PHARM REV CODE 250: Performed by: EMERGENCY MEDICINE

## 2017-12-11 PROCEDURE — 90471 IMMUNIZATION ADMIN: CPT | Performed by: EMERGENCY MEDICINE

## 2017-12-11 PROCEDURE — 99900035 HC TECH TIME PER 15 MIN (STAT): Performed by: GENERAL PRACTICE

## 2017-12-11 PROCEDURE — 85025 COMPLETE CBC W/AUTO DIFF WBC: CPT

## 2017-12-11 PROCEDURE — 93010 ELECTROCARDIOGRAM REPORT: CPT | Mod: ,,, | Performed by: NUCLEAR MEDICINE

## 2017-12-11 PROCEDURE — 84484 ASSAY OF TROPONIN QUANT: CPT

## 2017-12-11 RX ORDER — KETOCONAZOLE 20 MG/ML
SHAMPOO, SUSPENSION TOPICAL
COMMUNITY
Start: 2017-10-09 | End: 2018-03-04

## 2017-12-11 RX ORDER — LORATADINE 10 MG/1
10 TABLET ORAL
COMMUNITY
Start: 2016-05-30 | End: 2017-12-14

## 2017-12-11 RX ORDER — PANTOPRAZOLE SODIUM 40 MG/1
TABLET, DELAYED RELEASE ORAL
COMMUNITY
Start: 2017-10-30

## 2017-12-11 RX ORDER — METOPROLOL TARTRATE 25 MG/1
25 TABLET, FILM COATED ORAL NIGHTLY
COMMUNITY
Start: 2017-09-18 | End: 2018-03-04

## 2017-12-11 RX ORDER — BETAMETHASONE DIPROPIONATE 0.5 MG/ML
LOTION, AUGMENTED TOPICAL
COMMUNITY
Start: 2017-10-30 | End: 2018-03-04

## 2017-12-11 RX ADMIN — BACITRACIN ZINC, NEOMYCIN SULFATE, POLYMYXIN B SULFATE 1 EACH: 3.5; 5000; 4 OINTMENT TOPICAL at 11:12

## 2017-12-11 RX ADMIN — CLOSTRIDIUM TETANI TOXOID ANTIGEN (FORMALDEHYDE INACTIVATED), CORYNEBACTERIUM DIPHTHERIAE TOXOID ANTIGEN (FORMALDEHYDE INACTIVATED), BORDETELLA PERTUSSIS TOXOID ANTIGEN (GLUTARALDEHYDE INACTIVATED), BORDETELLA PERTUSSIS FILAMENTOUS HEMAGGLUTININ ANTIGEN (FORMALDEHYDE INACTIVATED), BORDETELLA PERTUSSIS PERTACTIN ANTIGEN, AND BORDETELLA PERTUSSIS FIMBRIAE 2/3 ANTIGEN 0.5 ML: 5; 2; 2.5; 5; 3; 5 INJECTION, SUSPENSION INTRAMUSCULAR at 10:12

## 2017-12-11 NOTE — DISCHARGE INSTRUCTIONS
__________________    As discussed, I do recommend admission because of the fact that you passed out, and you are therefore leaving AGAINST MEDICAL ADVICE.  If you change your mind, please come back at any time for reevaluation and admission.    Your basic tests were fine.    Your potassium is a little elevated, probably due to the medicine known as spironolactone (aldactone).  Please stop taking this medicine for now and review these recommendations with your doctor.    Please check with your doctor before making any other medication changes.    Routine care to the injuries as per nursing instructions and routine head injury care as outlined.    Return to the ER at eladio time.      __________________

## 2017-12-11 NOTE — Clinical Note
Date: 12/11/2017  Patient: Viral Ortega Jr.  Admitted: 12/11/2017  8:44 AM  Attending Provider: Marcello Holbrook MD    Viral Ortega Jr. or his authorized caregiver has made the decision for the patient to leave the emergency department against the a dvice of his attending physician. He or his authorized caregiver has been informed and understands the inherent risks, including death, stroke, worsened condition, missed diagnoses.  He or his authorized caregiver has decided to accept the responsibi lity for this decision. Viral Ortega Jr. and all necessary parties have been advised that he may return for further evaluation or treatment. His condition at time of discharge was stable.  Viral Ortega Jr. had current vital signs as follows:  / 74 (BP Location: Left arm, Patient Position: Lying)   Pulse 72   Temp 97.7 °F (36.5 °C) (Axillary)   Resp 20   Wt 83.5 kg (184 lb)

## 2017-12-11 NOTE — ED NOTES
Patient reports recently starting Metoprolol and medication makes him dizzy. Reports that had a dizzy spell while getting into bed last night and had a syncopal episode. Laceration noted to to of head. Denies additional syncopal episodes today.     LOC: The patient is awake, alert and aware of environment with an appropriate affect, the patient is oriented x 3 and speaking appropriately.  APPEARANCE: Patient resting comfortably and in no acute distress, patient is clean and well groomed, patient's clothing is properly fastened.  HEENT: Brief WNL.   SKIN: Brief WNL. Except 1.5cm laceration noted to top of head. Abrasion noted to top of head.   MUSCULOSKELETAL: Brief WNL  RESPIRATORY: Brief WNL  CARDIAC: Brief WNL. Except PPM in place.   GASTRO: Brief WNL  : Brief WNL  Peripheral Vasc: Brief WNL  NEURO: Brief WNL  PSYCH: Brief WNL

## 2017-12-11 NOTE — ED PROVIDER NOTES
Encounter Date: 12/11/2017       History     Chief Complaint   Patient presents with    Head Laceration     since 2300 last night, bleeding controlled, s/p syncopal episode     Lives at home with his wife, extensive past medical history as listed, chronically anticoagulated on Eliquis as listed.  Was getting into bed last night at about 10 or 10:30, believes that he had mostly gotten in bed but had a little bit of a weak spell and rolled out of bed, striking his head on a night table.  Unclear if he actually lost consciousness, his wife apparently said that he did.  Laceration to the top of his scalp, some nose bleeding, black eye on the left.  Mild posterior neck pain.  No other complaints.  Unclear why he did not come last night, presenting now nearly 12 hours after the reported injury.  Reports he has had similar episodes of syncope or near syncope in the past, he attributes this to metoprolol for unclear reasons.  Unclear when his last tetanus diphtheria booster was.  He drove himself here.  Feels fine, does not really want much in the way of evaluation.  The laceration on the top of his scalp has stopped bleeding now and he has minimal if any other complaints.  Denies palpitations, chest pain, shortness of breath, localizing neurologic complaint, or other problems.      The history is provided by the patient. No  was used.     Review of patient's allergies indicates:   Allergen Reactions    Amoxicillin-pot clavulanate      GI upset    Codeine     Diphenhydramine hcl      hyper    Hydrocodone     Oxycodone-acetaminophen     Propoxyphene n-acetaminophen      Past Medical History:   Diagnosis Date    Anemia     Anticoagulant long-term use     BPH (benign prostatic hyperplasia)     Cancer     Non-hodgkins lymphoma    Colitis     DVT (deep venous thrombosis)     GERD (gastroesophageal reflux disease)     Hypertension     Orthostatic hypotension dysautonomic syndrome      Peripheral neuropathy     Pulmonary fibrosis     Shingles     Sick sinus syndrome     UC (ulcerative colitis)      Past Surgical History:   Procedure Laterality Date    APPENDECTOMY      CARDIAC PACEMAKER PLACEMENT      CARDIAC PACEMAKER PLACEMENT      CATARACT EXTRACTION      COLONOSCOPY      STEFANO FILTER PLACEMENT  Jan . 2015    INGUINAL HERNIA REPAIR Right     KNEE SURGERY Right     MEDIPORT INSERTION, DOUBLE      TONSILLECTOMY       History reviewed. No pertinent family history.  Social History   Substance Use Topics    Smoking status: Never Smoker    Smokeless tobacco: Never Used    Alcohol use No     Review of Systems   Constitutional: Negative for chills and fever.   HENT: Negative for congestion, facial swelling, nosebleeds and sinus pressure.    Eyes: Negative for pain and redness.   Respiratory: Negative for chest tightness, shortness of breath and wheezing.    Cardiovascular: Negative for chest pain, palpitations and leg swelling.   Gastrointestinal: Negative for abdominal distention, abdominal pain, diarrhea, nausea and vomiting.   Endocrine: Negative for cold intolerance, polydipsia and polyphagia.   Genitourinary: Negative for difficulty urinating, dysuria, frequency and hematuria.   Musculoskeletal: Negative for arthralgias, back pain, myalgias and neck pain.   Skin: Positive for wound. Negative for color change and rash.   Neurological: Positive for syncope. Negative for dizziness, weakness, numbness and headaches.   Hematological: Negative for adenopathy. Does not bruise/bleed easily.   Psychiatric/Behavioral: Negative for agitation and behavioral problems.   All other systems reviewed and are negative.      Physical Exam     Initial Vitals [12/11/17 0842]   BP Pulse Resp Temp SpO2   103/69 86 18 97.7 °F (36.5 °C) 97 %      MAP       80.33         Physical Exam    Nursing note and vitals reviewed.  Constitutional: He appears well-developed and well-nourished. He is not  diaphoretic. No distress.   HENT:   Head: Normocephalic.   Mouth/Throat: Oropharynx is clear and moist. No oropharyngeal exudate.   Curvilinear laceration on the superior aspect of the scalp about 2 or 3 cm in length, well apposed, mild adherent clot, no active bleeding and no sign of infection.  Minor abrasion to the frontal aspect of the forehead and mild ecchymosis in the left infraorbital region.  No other findings.  Tympanic membranes and ear canals are clear.    Eyes: Conjunctivae and EOM are normal. Pupils are equal, round, and reactive to light. Right eye exhibits no discharge. Left eye exhibits no discharge. No scleral icterus.   No sign of entrapment   Neck: Normal range of motion. Neck supple. No thyromegaly present. No tracheal deviation present. No JVD present.   Cardiovascular: Normal rate, regular rhythm and normal heart sounds. Exam reveals no gallop and no friction rub.    No murmur heard.  Pulmonary/Chest: Breath sounds normal. No respiratory distress. He has no wheezes. He has no rhonchi. He has no rales. He exhibits no tenderness.   Abdominal: Soft. Bowel sounds are normal. He exhibits no distension and no mass. There is no tenderness. There is no rebound and no guarding.   Musculoskeletal: Normal range of motion. He exhibits no edema or tenderness.   Lymphadenopathy:     He has no cervical adenopathy.   Neurological: He is alert and oriented to person, place, and time. He has normal strength. No cranial nerve deficit.   Skin: Skin is warm and dry. No rash noted. No erythema.   Psychiatric: He has a normal mood and affect. His behavior is normal. Judgment and thought content normal.         ED Course   Procedures  Labs Reviewed   CBC W/ AUTO DIFFERENTIAL - Abnormal; Notable for the following:        Result Value    RBC 4.48 (*)     MCH 31.3 (*)     RDW 16.0 (*)     Mono # 1.1 (*)     All other components within normal limits   COMPREHENSIVE METABOLIC PANEL - Abnormal; Notable for the following:      Potassium 5.9 (*)     CO2 21 (*)     BUN, Bld 31 (*)     Albumin 3.1 (*)     eGFR if  57.9 (*)     eGFR if non  50.1 (*)     All other components within normal limits   TROPONIN I     EKG Readings: (Independently Interpreted)   Initial Reading: No STEMI.   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  No acute changes.       Imaging Results          CT Cervical Spine Without Contrast (Final result)  Result time 12/11/17 11:19:11    Final result by LIONEL Avalos Sr., MD (12/11/17 11:19:11)                 Impression:      1. There is grade 1 anterolisthesis of C7 on T1.   2. There is an 11 mm oval-shaped osseous density adjacent to the posterior aspect of the spinous process of C7. This is characteristic of a fracture fragment or an unfused accessory ossification center.  3. There are mild degenerative changes between C2 and C7.   4. There are mild osteoarthritic changes in the facet joints bilaterally.   5. There is a mild amount of atherosclerosis.        All CT scans at this facility use dose modulation, iterative reconstruction, and/or weight base dosing when appropriate to reduce radiation dose when appropriate to reduce radiation dose to as low as reasonably achievable.      Electronically signed by: LIONEL AVALOS MD  Date:     12/11/17  Time:    11:19              Narrative:    CT of c-spine without IV contrast    History: Neck pain status post trauma    Technique: Standard cervical spine CT protocol was performed without IV contrast.    Finding: There is an 11 mm oval-shaped osseous density adjacent to the posterior aspect of the spinous process of C7. There is grade 1 anterolisthesis of C7 on T1. There is no scoliosis. There are mild degenerative changes between C2 and C7. There are mild osteoarthritic changes in the facet joints  bilaterally. There is a mild amount of atherosclerosis.                             CT Maxillofacial Without Contrast (Final result)  Result time 12/11/17 11:28:09    Final result by LIONEL Avalos Sr., MD (12/11/17 11:28:09)                 Impression:      1. There is no acute fracture visualized.  2. There is an 11 mm polyp versus mucus retention cyst in the left maxillary sinus.   3. There is mild mucosal sinus thickening in the frontal sinuses. The thickening in the right frontal sinus measures 4 mm.   4. There is a small richie bullosa in the right middle nasal turbinate.   5. There is marked deviation to the left of the nasal septum.       All CT scans at this facility use dose modulation, iterative reconstruction, and/or weight base dosing when appropriate to reduce radiation dose when appropriate to reduce radiation dose to as low as reasonably achievable.      Electronically signed by: LIONEL AVALOS MD  Date:     12/11/17  Time:    11:28              Narrative:    CT of the facial bones without IV contrast    Clinical History:     Headache status post trauma     Technique: Standard facial bone CT protocol without IV contrast material was performed     Finding: The ostiomeatal complexes are patent bilaterally. There is an 11 mm polyp versus mucus retention cyst in the left maxillary sinus. There is mild mucosal sinus thickening in the frontal sinuses. The thickening in the right frontal sinus measures 4 mm. There is a small richie bullosa in the right middle nasal turbinate. There is marked deviation to the left of the nasal septum. There is no acute fracture visualized. There is no dislocation.                             CT Head Without Contrast (Edited Result - FINAL)  Result time 12/11/17 11:27:55    Addendum 1 of 1 by LIONEL Avalos Sr., MD (12/11/17 11:27:55)    Addendum: Comparison was made to a CT examination of the facial bones performed on 12/11/2017. There is no acute fracture visualized. The  abnormality seen in the proximal portion of the right nasal bone is characteristic of a prominent suture.      Electronically signed by: LIONEL AVALOS MD  Date:     12/11/17  Time:    11:27                Final result by LIONEL Avalos Sr., MD (12/11/17 11:09:54)                 Impression:      1. There is an acute-appearing nondisplaced fracture in the proximal portion of the right nasal bone.   2. There is no intracranial hemorrhage.   3. There are subacute to chronic appearing ischemic changes in both cerebral hemispheres.   4. There is an 8 mm polyp versus mucous retention cyst in the left maxillary sinus.  5. There is mild mucosal sinus thickening in the frontal sinuses. The thickening in the right frontal sinus measures 6 mm.     All CT scans at this facility use dose modulation, iterative reconstruction, and/or weight base dosing when appropriate to reduce radiation dose when appropriate to reduce radiation dose to as low as reasonably achievable.      Electronically signed by: LIONEL AVALOS MD  Date:     12/11/17  Time:    11:09              Narrative:    CT of Head without IV contrast    History:     Headache status post trauma    Technique: Standard brain CT protocol without IV contrast was performed.     Finding: There is mild generalized cerebral cortical atrophy. There are mild bilateral periventricular ischemic white matter changes. There are subacute to chronic appearing ischemic changes in both cerebral hemispheres. There is an acute-appearing nondisplaced fracture in the proximal portion of the right nasal bone. There is no intracranial hemorrhage. There is mild mucosal sinus thickening in the frontal sinuses. The thickening in the right frontal sinus measures 6 mm. There is an 8 mm polyp versus mucous retention cyst in the left maxillary sinus.                                                     ED Course      11:43 AM Vital signs stable, no complaints, no further episodes of any type.  Reviewed all  results in detail.  I have strongly recommended admission for syncope due to age, but he is declining admission and agrees that he will sign out AGAINST MEDICAL ADVICE.  His reasoning for this is that he feels he is fine and does not wish to stay overnight having already been greater than 12 hours since the episode.  I've explained to him that we cannot fully evaluate for syncope in the emergency room and he is risking missed diagnosis, delayed presentation of stroke or cardiac event, death, and other possibilities by leaving AGAINST MEDICAL ADVICE, and he understands.  He is competent to make this decision.  I give him all routine discharge instructions for all identified conditions and encouraged him strongly to return to the emergency department at any point for reevaluation and readmission.  He understands and agrees.    Clinical Impression:     1. Syncope, unspecified syncope type    2. Chest pain    3. Closed head injury, initial encounter    4. Laceration of scalp, initial encounter    5. Hyperkalemia          Disposition:   Condition: Stable                        Marcello Holbrook MD  12/11/17 7820

## 2017-12-14 ENCOUNTER — HOSPITAL ENCOUNTER (EMERGENCY)
Facility: HOSPITAL | Age: 82
Discharge: HOME | End: 2017-12-14
Attending: EMERGENCY MEDICINE
Payer: MEDICARE

## 2017-12-14 VITALS
WEIGHT: 180 LBS | SYSTOLIC BLOOD PRESSURE: 133 MMHG | DIASTOLIC BLOOD PRESSURE: 67 MMHG | OXYGEN SATURATION: 96 % | TEMPERATURE: 98 F | RESPIRATION RATE: 18 BRPM | HEART RATE: 77 BPM | HEIGHT: 70 IN | BODY MASS INDEX: 25.77 KG/M2

## 2017-12-14 DIAGNOSIS — J02.9 SORE THROAT: ICD-10-CM

## 2017-12-14 DIAGNOSIS — J01.90 ACUTE RHINOSINUSITIS: Primary | ICD-10-CM

## 2017-12-14 DIAGNOSIS — J02.9 ACUTE PHARYNGITIS, UNSPECIFIED ETIOLOGY: ICD-10-CM

## 2017-12-14 PROCEDURE — 99283 EMERGENCY DEPT VISIT LOW MDM: CPT

## 2017-12-14 RX ORDER — METHYLPREDNISOLONE 4 MG/1
TABLET ORAL
Qty: 1 PACKAGE | Refills: 0 | Status: SHIPPED | OUTPATIENT
Start: 2017-12-14 | End: 2018-11-27

## 2017-12-14 RX ORDER — AZITHROMYCIN 250 MG/1
TABLET, FILM COATED ORAL
Qty: 6 TABLET | Refills: 0 | Status: SHIPPED | OUTPATIENT
Start: 2017-12-14 | End: 2017-12-19

## 2017-12-14 NOTE — DISCHARGE INSTRUCTIONS
Your medications have been sent electronically to the [facility] in [place].

## 2017-12-14 NOTE — ED PROVIDER NOTES
Encounter Date: 12/14/2017       History     Chief Complaint   Patient presents with    Sore Throat     Onset yesterday. Reports cough. Denies fever.      The history is provided by the patient.   Sore Throat   This is a new problem. The current episode started 2 days ago. The problem has been gradually worsening. Associated symptoms include headaches (frontal). Pertinent negatives include no chest pain, no abdominal pain and no shortness of breath. The symptoms are aggravated by swallowing and coughing. Nothing relieves the symptoms. He has tried nothing for the symptoms. The treatment provided no relief.   URI   The primary symptoms include headaches (frontal), sore throat and cough. Primary symptoms do not include fever, ear pain, abdominal pain, nausea, vomiting or rash. The current episode started two days ago. This is a recurrent problem. The problem has been gradually worsening.   The headache began 2 days ago. The headache developed gradually. The headache is present intermittently. The pain from the headache is at a severity of 5/10. Location/region(s) of the headache: frontal. The headache is not associated with aura, photophobia, eye pain, visual change, neck stiffness, paresthesias or loss of balance.   The sore throat began 2 days ago. The sore throat is not accompanied by trouble swallowing, drooling, hoarse voice or stridor. The sore throat pain is at a severity of 5/10.   Symptoms associated with the illness include sinus pressure and congestion. The illness is not associated with chills, facial pain or rhinorrhea. The following treatments were addressed: Acetaminophen was not tried. A decongestant was not tried. NSAIDs were not tried.         PCP:   Narinder Rivera MD        Review of patient's allergies indicates:   Allergen Reactions    Amoxicillin-pot clavulanate      GI upset    Codeine     Diphenhydramine hcl      hyper    Hydrocodone     Oxycodone-acetaminophen     Propoxyphene  n-acetaminophen      Past Medical History:   Diagnosis Date    Anemia     Anticoagulant long-term use     BPH (benign prostatic hyperplasia)     Cancer     Non-hodgkins lymphoma    Colitis     DVT (deep venous thrombosis)     GERD (gastroesophageal reflux disease)     Hypertension     Orthostatic hypotension dysautonomic syndrome     Peripheral neuropathy     Pulmonary fibrosis     Shingles     Sick sinus syndrome     UC (ulcerative colitis)      Past Surgical History:   Procedure Laterality Date    APPENDECTOMY      CARDIAC PACEMAKER PLACEMENT      CARDIAC PACEMAKER PLACEMENT      CATARACT EXTRACTION      COLONOSCOPY      STEFANO FILTER PLACEMENT  Jan . 2015    INGUINAL HERNIA REPAIR Right     KNEE SURGERY Right     MEDIPORT INSERTION, DOUBLE      TONSILLECTOMY       History reviewed. No pertinent family history.  Social History   Substance Use Topics    Smoking status: Never Smoker    Smokeless tobacco: Never Used    Alcohol use No     Review of Systems   Constitutional: Negative for chills and fever.   HENT: Positive for congestion, postnasal drip, sinus pressure and sore throat. Negative for drooling, ear discharge, ear pain, facial swelling, hoarse voice, rhinorrhea, sneezing and trouble swallowing.    Eyes: Negative for photophobia, pain and discharge.   Respiratory: Positive for cough. Negative for chest tightness, shortness of breath and stridor.    Cardiovascular: Negative for chest pain.   Gastrointestinal: Negative for abdominal pain, diarrhea, nausea and vomiting.   Genitourinary: Negative for dysuria.   Musculoskeletal: Negative for back pain and neck stiffness.   Skin: Negative for rash.   Neurological: Positive for headaches (frontal). Negative for dizziness, weakness, paresthesias and loss of balance.   Hematological: Does not bruise/bleed easily.       Physical Exam     Initial Vitals [12/14/17 1300]   BP Pulse Resp Temp SpO2   133/67 77 18 98.1 °F (36.7 °C) 96 %       MAP       89         Physical Exam    Nursing note and vitals reviewed.  Constitutional: Vital signs are normal. He appears well-developed and well-nourished. He is cooperative. He does not appear ill. No distress.   HENT:   Head: Normocephalic and atraumatic.   Right Ear: Hearing, tympanic membrane, external ear and ear canal normal.   Left Ear: Hearing, tympanic membrane, external ear and ear canal normal.   Nose: Mucosal edema and sinus tenderness present.   Mouth/Throat: Uvula is midline and mucous membranes are normal. Posterior oropharyngeal erythema present.   Tonsils are absent.    Eyes: Conjunctivae, EOM and lids are normal. Pupils are equal, round, and reactive to light.   Neck: Trachea normal and normal range of motion. Neck supple.   Cardiovascular: Normal rate, regular rhythm, intact distal pulses and normal pulses.   Pulmonary/Chest: Effort normal and breath sounds normal. No accessory muscle usage. No respiratory distress. He has no decreased breath sounds. He has no wheezes. He has no rhonchi. He has no rales.   Abdominal: Soft. He exhibits no distension and no mass. There is no tenderness. There is no rebound and no guarding.   Musculoskeletal: Normal range of motion. He exhibits no edema.   Lymphadenopathy:     He has no cervical adenopathy.   Neurological: He is alert and oriented to person, place, and time. He has normal strength. GCS eye subscore is 4. GCS verbal subscore is 5. GCS motor subscore is 6.   Neurovascular intact to all extremities.  Normal gait.    Skin: Skin is warm, dry and intact. Capillary refill takes less than 2 seconds. No rash noted.   Psychiatric: He has a normal mood and affect. His speech is normal and behavior is normal. Thought content normal.         ED Course   Procedures            Medical Decision Making:   History:   Old Records Summarized: records from clinic visits.  ED Management:  Patient prescribed Azithromycin due to penicillin allergy.                       Clinical Impression:       ICD-10-CM ICD-9-CM   1. Acute rhinosinusitis J01.90 461.9   2. Acute pharyngitis, unspecified etiology J02.9 462   3. Sore throat J02.9 462         Disposition:   Disposition: Discharged  Condition: Stable  I discussed with patient that the evaluation in the emergency department does not suggest any emergent or life threatening medical condition requiring immediate intervention beyond what was provided in the ED, and I believe patient is safe for discharge.  Regardless, an unremarkable evaluation in the ED does not preclude the development or presence of a serious of life threatening condition. As such, patient was instructed to return immediately for any worsening or change in current symptoms. I also discussed the results of my evaluation and diagnostics with patient and he concurs with the evaluation and management plan.  Detailed written and verbal instructions provided to patient and he expressed a verbal understanding of the discharge instructions and overall management plan. Reiterated the importance of medication administration and safety and advised patient to follow up with primary care provider in 3-5 days or sooner if needed.  Also advised patient to return to the ER for any complications.     Regarding SORE THROAT, recommended that the patient: rest more than usual; refrain from smoking or being in smoke-filled environment; drink juice, milk shakes, tea, or soup if throat is too sore to eat solid food; gargle with warm salt water; suck on crushed ice, hard candy, cough drops, or throat lozenges; and take acetaminophen or ibuprofen as needed for fever or pain.     Regarding UPPER RESPIRATORY ILLNESS, for treatment, I encouraged patient to: drink plenty of fluids; get lots of rest; take medications as prescribed; use over-the-counter medications for symptomatic treatment;  and use a humidifier or steam in the bathroom to improve patency of upper airway.  Patient instructed  to notify primary care provider, or return to the emergency department, if the they: have a cough most days or have a cough that returns frequently; begin coughing up blood; develop a high fever or shaking chills; have a low-grade fever for three or more days; develop thick, greenish mucus, especially if it has a bad smell; and experience shortness of breath or chest pain. For prevention, discussed with patient the importance of refraining from smoking (if applicable), getting annual influenza vaccines, reducing exposure to air pollution, and the need to frequently wash hands to avoid spread of infection.     Regarding SINUSITIS, for treatment, encouraged patient to refrain from smoking, drink plenty of fluids, rest, take medications as prescribed, and to use a humidifier or steam in the bathroom. Patient instructed to notify primary care provider if: they have a cough most days or have a cough that returns frequently; are coughing up blood; have a high fever or shaking chills; have a low-grade fever for three or more days; develop thick, greenish mucus, especially if it has a bad smell; and feel short of breath or have chest pain, or return to emergency department for further evaluation. Also recommended that the patient shower in the morning and evening to wash away any allergens and help reduce the production of mucus, avoid taking decongestants if diagnosed with hypertension.  If decongestants were recommended, advised patient to not take for longer than 5 days to help prevent rebound congestion. For prevention, discussed with patient the importance of not smoking, getting annual influenza vaccines, reducing exposure to air pollution, and to frequently wash hands to avoid spread of infection. Instructed patient to follow up with primary care provider.           New Prescriptions    AZITHROMYCIN (Z-FELICITY) 250 MG TABLET    Take 2 tablets by mouth on day 1; Take 1 tablet by mouth on days 2-5    METHYLPREDNISOLONE  (MEDROL DOSEPACK) 4 MG TABLET    Take as directed.       Follow-up Information     Call  Narinder Rivera MD.    Specialty:  Family Medicine  Why:  If symptoms worsen or as needed  Contact information:  [address] [place] [address] [address]  [facility] [address] [phone]                                Gallito Jose NP  12/14/17 6033

## 2018-03-04 ENCOUNTER — HOSPITAL ENCOUNTER (EMERGENCY)
Facility: HOSPITAL | Age: 83
Discharge: HOME | End: 2018-03-04
Attending: EMERGENCY MEDICINE
Payer: MEDICARE

## 2018-03-04 VITALS
TEMPERATURE: 97 F | RESPIRATION RATE: 16 BRPM | BODY MASS INDEX: 26.26 KG/M2 | SYSTOLIC BLOOD PRESSURE: 139 MMHG | OXYGEN SATURATION: 97 % | DIASTOLIC BLOOD PRESSURE: 74 MMHG | HEART RATE: 62 BPM | WEIGHT: 183 LBS

## 2018-03-04 DIAGNOSIS — R68.89 ABNORMAL ANKLE BRACHIAL INDEX (ABI): ICD-10-CM

## 2018-03-04 DIAGNOSIS — R60.9 SWELLING: ICD-10-CM

## 2018-03-04 DIAGNOSIS — L03.115 CELLULITIS OF RIGHT LEG: Primary | ICD-10-CM

## 2018-03-04 DIAGNOSIS — I10 ESSENTIAL HYPERTENSION: ICD-10-CM

## 2018-03-04 PROCEDURE — 99283 EMERGENCY DEPT VISIT LOW MDM: CPT

## 2018-03-04 PROCEDURE — 25000003 PHARM REV CODE 250: Performed by: EMERGENCY MEDICINE

## 2018-03-04 RX ORDER — CLINDAMYCIN HYDROCHLORIDE 150 MG/1
300 CAPSULE ORAL
Status: COMPLETED | OUTPATIENT
Start: 2018-03-04 | End: 2018-03-04

## 2018-03-04 RX ORDER — CLINDAMYCIN HYDROCHLORIDE 300 MG/1
300 CAPSULE ORAL EVERY 8 HOURS
Qty: 21 CAPSULE | Refills: 0 | Status: SHIPPED | OUTPATIENT
Start: 2018-03-04 | End: 2018-09-05 | Stop reason: ALTCHOICE

## 2018-03-04 RX ADMIN — CLINDAMYCIN HYDROCHLORIDE 300 MG: 150 CAPSULE ORAL at 10:03

## 2018-03-04 NOTE — ED PROVIDER NOTES
"Encounter Date: 3/4/2018       History     Chief Complaint   Patient presents with    Rash     redness to right shin area with wound x 2 weeks     CHIEF COMPLIANT: Rash (redness to right shin area with wound x 2 weeks)      3/4/2018, 8:57 AM     The history is provided by the patient. Viral Ortega Jr. is a 85 y.o. male presenting to the ED for painful spot to his right shin and redness to the right shin.  Patient reports that he had scraped his right shin approximately 2 weeks ago.  Shortly after that he been ambulating when he ran his right foot/lower leg into the [employer].  He notes that there was a knot that formed there.  He is on Eliquis-has a history of atrial fibrillation as well as prior pulmonary embolism.  He notes that the area of the leg appeared as a soreness".  That it started developing slight redness.  He is concerned that he has a cellulitis.  Patient denies any temperature, fever, malaise, nausea, vomiting, chest pain, chest pressure, difficulty breathing.  Nothing makes it better, nothing makes it worse.  He notes that he's seen a vascular physician 3 years ago at [facility].  He reports that he spits keep his legs wrapped.  He is not aware of any "peripheral vascular disease".  He notes that his toes on his feet have always been purple.  He reports that he has "neuropathic pain".  He states that his feet and toes have always been purple.  He denies any pain out of proportion.  He reports that he still is able to wiggle his toes and has sensation to his toes.  Reports that when he keeps his legs up, the toes return to natural collar.  He also reports he has varicose veins.    PCP: Narinedr Rivera MD  Specialist:             Review of patient's allergies indicates:   Allergen Reactions    Codeine     Hydrocodone     Oxycodone-acetaminophen     Propoxyphene n-acetaminophen      Past Medical History:   Diagnosis Date    Anemia     Anticoagulant long-term use     BPH (benign prostatic hyperplasia)  "    Cancer     Non-hodgkins lymphoma    Colitis     DVT (deep venous thrombosis)     GERD (gastroesophageal reflux disease)     Hypertension     Orthostatic hypotension dysautonomic syndrome     Peripheral neuropathy     Pulmonary fibrosis     Shingles     Sick sinus syndrome     UC (ulcerative colitis)      Past Surgical History:   Procedure Laterality Date    APPENDECTOMY      CARDIAC PACEMAKER PLACEMENT      CARDIAC PACEMAKER PLACEMENT      CATARACT EXTRACTION      COLONOSCOPY      STEFANO FILTER PLACEMENT  Jan . 2015    INGUINAL HERNIA REPAIR Right     KNEE SURGERY Right     MEDIPORT INSERTION, DOUBLE      TONSILLECTOMY       No family history on file.  Social History   Substance Use Topics    Smoking status: Never Smoker    Smokeless tobacco: Never Used    Alcohol use No     Review of Systems   Constitutional: Negative for fatigue and fever.   HENT: Negative for sore throat.    Respiratory: Negative for shortness of breath.    Cardiovascular: Negative for chest pain.   Gastrointestinal: Negative for nausea and vomiting.   Genitourinary: Negative for dysuria.   Musculoskeletal: Negative for back pain.   Skin: Positive for color change and wound (Abrasion to right anterior tibia). Negative for rash.        Purple discoloration of foot - no pain out of proportion.  No pain with walking.   Neurological: Negative for weakness.   Hematological: Does not bruise/bleed easily.       Physical Exam     Initial Vitals [03/04/18 0844]   BP Pulse Resp Temp SpO2   (!) 152/82 79 18 96.2 °F (35.7 °C) 98 %      MAP       105.33         Physical Exam    Nursing note and vitals reviewed.  Constitutional: He appears well-developed and well-nourished.   HENT:   Head: Normocephalic.   Mouth/Throat: Oropharynx is clear and moist.   Eyes: Pupils are equal, round, and reactive to light.   Neck: Normal range of motion.   Cardiovascular: Normal rate, regular rhythm, normal heart sounds and intact distal pulses.    Toes appear purple in color.  Cap refill 3-5 seconds.  Tibialis posterior pulses on the right are felt.    Tibialis posterior dopplered on the left, bilateral Doppler pulses noted on dorsalis pedis.   Pulmonary/Chest: Breath sounds normal.   Abdominal: Soft. Bowel sounds are normal. There is no tenderness. There is no rebound and no guarding.   Musculoskeletal: Normal range of motion.   Neurological: He is alert and oriented to person, place, and time. He has normal strength. No cranial nerve deficit.   Sensation in the toes noted.  Patient able to wiggle toes.  Patient is able to walk from triage to room 1 without stopping or pain in legs.   Skin: Skin is warm. There is erythema (There is erythema noted to the right lateral anterior tibia.).   There is a superficial ulcer to the right second toe.   Psychiatric: He has a normal mood and affect. Judgment and thought content normal.                 ED Course   Procedures  Labs Reviewed - No data to display        Left Arm - 146/79  Left Leg = 250/124  RONAL = 1.7    Right arm 152/77  Right leg = 228/100  RONAL = 1.5                     Medications   clindamycin capsule 300 mg (300 mg Oral Given 3/4/18 1050)     10:28 AM Spoke with Dr. PHILIP Bentley, can follow up in office:   For outpatient toe pressure.    10:30 AM comfortably and is NAD.  Discussed importance of following up for vascular surgery.  No interactions noted between flecainide and clindamycin.  Instructed patient to return to the emergency room for worsening foot pain, fevers, chills, body aches, malaise, or other concerns.  Pt states their sx have improved. Discussed test results, shared treatment plan, specific conditions for return, and the need for f/u.  Answered their questions at this time.  Pt understands and agrees to the plan.  The pt has remained hemodynamically stable through ED course and is stable for discharge.    Follow-up Information     Narinder Rivera MD. Schedule an appointment as soon as  possible for a visit in 2 days.    Specialty:  Family Medicine  Why:  Keep legs wrapped.  Return to the ED for:  increasing redness, swelling, chest pain, chest pain, shortness of breath, fever, worsening foot pain or other concerns.  Contact information:  [address]  [facility] [address] [phone]             Ambrosio Bentley MD In 2 days.    Specialty:  Vascular Surgery  Contact information:  [address] [place] [address]  3rd Floor  [facility] [address]  [phone]                   Discharge Medication List as of 3/4/2018 10:38 AM      START taking these medications    Details   clindamycin (CLEOCIN) 300 MG capsule Take 1 capsule (300 mg total) by mouth every 8 (eight) hours., Starting Sun 3/4/2018, Print              Discharge Medication List as of 3/4/2018 10:38 AM      STOP taking these medications       augmented betamethasone (DIPROLENE) 0.05 % lotion Comments:   Reason for Stopping:               I discussed with patient and/or family/caretaker that evaluation in the ED does not suggest any emergent or life threatening medical conditions requiring immediate intervention beyond what was provided in the ED, and I believe patient is safe for discharge.  Regardless, an unremarkable evaluation in the ED does not preclude the development or presence of a serious of life threatening condition. As such, patient was instructed to return immediately for any worsening or change in current symptoms.    Pre-hypertension/Hypertension: The pt has been informed that they may have pre-hypertension or hypertension based on a blood pressure reading in the ED. I recommend that the pt call the PCP listed on their discharge instructions or a physician of their choice this week to arrange f/u for further evaluation of possible pre-hypertension or hypertension.             Clinical Impression:       ICD-10-CM ICD-9-CM   1. Cellulitis of right leg L03.115 682.6   2. Swelling R60.9 782.3   3. Abnormal ankle brachial index (RONAL)  R68.89 796.4   4. Essential hypertension I10 401.9       Disposition:   Disposition: Discharged  Condition: Stable                        Sisi Velasquez,   03/04/18 1137

## 2018-03-06 ENCOUNTER — HOSPITAL ENCOUNTER (EMERGENCY)
Facility: HOSPITAL | Age: 83
Discharge: HOME | End: 2018-03-06
Attending: EMERGENCY MEDICINE
Payer: MEDICARE

## 2018-03-06 VITALS
BODY MASS INDEX: 26.11 KG/M2 | HEART RATE: 67 BPM | DIASTOLIC BLOOD PRESSURE: 78 MMHG | SYSTOLIC BLOOD PRESSURE: 165 MMHG | TEMPERATURE: 98 F | OXYGEN SATURATION: 99 % | WEIGHT: 182 LBS | RESPIRATION RATE: 19 BRPM

## 2018-03-06 DIAGNOSIS — R13.10 ODYNOPHAGIA: Primary | ICD-10-CM

## 2018-03-06 DIAGNOSIS — R07.9 CHEST PAIN: ICD-10-CM

## 2018-03-06 LAB
ALBUMIN SERPL BCP-MCNC: 3.3 G/DL
ALP SERPL-CCNC: 105 U/L
ALT SERPL W/O P-5'-P-CCNC: 13 U/L
ANION GAP SERPL CALC-SCNC: 8 MMOL/L
AST SERPL-CCNC: 19 U/L
BASOPHILS # BLD AUTO: 0.03 K/UL
BASOPHILS NFR BLD: 0.4 %
BILIRUB SERPL-MCNC: 0.7 MG/DL
BUN SERPL-MCNC: 28 MG/DL
CALCIUM SERPL-MCNC: 8.8 MG/DL
CHLORIDE SERPL-SCNC: 107 MMOL/L
CO2 SERPL-SCNC: 22 MMOL/L
CREAT SERPL-MCNC: 1.3 MG/DL
DIFFERENTIAL METHOD: ABNORMAL
EOSINOPHIL # BLD AUTO: 0.2 K/UL
EOSINOPHIL NFR BLD: 2.1 %
ERYTHROCYTE [DISTWIDTH] IN BLOOD BY AUTOMATED COUNT: 15.4 %
EST. GFR  (AFRICAN AMERICAN): 57.5 ML/MIN/1.73 M^2
EST. GFR  (NON AFRICAN AMERICAN): 49.8 ML/MIN/1.73 M^2
GLUCOSE SERPL-MCNC: 101 MG/DL
HCT VFR BLD AUTO: 44.1 %
HGB BLD-MCNC: 14.8 G/DL
LYMPHOCYTES # BLD AUTO: 2.3 K/UL
LYMPHOCYTES NFR BLD: 28.4 %
MCH RBC QN AUTO: 31.2 PG
MCHC RBC AUTO-ENTMCNC: 33.6 G/DL
MCV RBC AUTO: 93 FL
MONOCYTES # BLD AUTO: 1 K/UL
MONOCYTES NFR BLD: 12.8 %
NEUTROPHILS # BLD AUTO: 4.5 K/UL
NEUTROPHILS NFR BLD: 55.9 %
PLATELET # BLD AUTO: 204 K/UL
PMV BLD AUTO: 10.3 FL
POTASSIUM SERPL-SCNC: 5 MMOL/L
PROT SERPL-MCNC: 6.5 G/DL
RBC # BLD AUTO: 4.74 M/UL
SODIUM SERPL-SCNC: 137 MMOL/L
TROPONIN I SERPL DL<=0.01 NG/ML-MCNC: 0.01 NG/ML
WBC # BLD AUTO: 8.03 K/UL

## 2018-03-06 PROCEDURE — 84484 ASSAY OF TROPONIN QUANT: CPT

## 2018-03-06 PROCEDURE — 25000003 PHARM REV CODE 250: Performed by: EMERGENCY MEDICINE

## 2018-03-06 PROCEDURE — 85025 COMPLETE CBC W/AUTO DIFF WBC: CPT

## 2018-03-06 PROCEDURE — 96360 HYDRATION IV INFUSION INIT: CPT

## 2018-03-06 PROCEDURE — 99284 EMERGENCY DEPT VISIT MOD MDM: CPT | Mod: 25

## 2018-03-06 PROCEDURE — 93010 ELECTROCARDIOGRAM REPORT: CPT | Mod: ,,, | Performed by: INTERNAL MEDICINE

## 2018-03-06 PROCEDURE — 25500020 PHARM REV CODE 255: Performed by: EMERGENCY MEDICINE

## 2018-03-06 PROCEDURE — 80053 COMPREHEN METABOLIC PANEL: CPT

## 2018-03-06 PROCEDURE — 96361 HYDRATE IV INFUSION ADD-ON: CPT

## 2018-03-06 RX ORDER — PHENOBARBITAL 16.2 MG/1
16.2 TABLET ORAL 2 TIMES DAILY
COMMUNITY
End: 2019-09-05

## 2018-03-06 RX ORDER — MIDODRINE HYDROCHLORIDE 10 MG/1
10 TABLET ORAL 3 TIMES DAILY
Status: ON HOLD | COMMUNITY
End: 2018-11-28 | Stop reason: HOSPADM

## 2018-03-06 RX ADMIN — IOHEXOL 75 ML: 350 INJECTION, SOLUTION INTRAVENOUS at 03:03

## 2018-03-06 RX ADMIN — SODIUM CHLORIDE 100 ML: 0.9 INJECTION, SOLUTION INTRAVENOUS at 03:03

## 2018-03-06 RX ADMIN — SODIUM CHLORIDE 500 ML: 0.9 INJECTION, SOLUTION INTRAVENOUS at 03:03

## 2018-03-06 NOTE — DISCHARGE INSTRUCTIONS
_________________    CT scan and other tests are fine.    Continue current medicines; soft diet as needed; and keep your appointment with the gastroenterologist.    Return as needed.    ________________

## 2018-03-06 NOTE — ED NOTES
Pt stable, in NAD, and states no further needs at this time. MD aware of vitals. Pt to be d/c'd home.

## 2018-09-05 ENCOUNTER — HOSPITAL ENCOUNTER (OUTPATIENT)
Dept: RADIOLOGY | Facility: HOSPITAL | Age: 83
Discharge: HOME | End: 2018-09-05
Attending: INTERNAL MEDICINE
Payer: MEDICARE

## 2018-09-05 ENCOUNTER — PROCEDURE VISIT (OUTPATIENT)
Dept: PULMONOLOGY | Facility: CLINIC | Age: 83
End: 2018-09-05
Payer: MEDICARE

## 2018-09-05 ENCOUNTER — OFFICE VISIT (OUTPATIENT)
Dept: PULMONOLOGY | Facility: CLINIC | Age: 83
End: 2018-09-05
Payer: MEDICARE

## 2018-09-05 VITALS
HEART RATE: 71 BPM | HEIGHT: 70 IN | SYSTOLIC BLOOD PRESSURE: 118 MMHG | BODY MASS INDEX: 26.54 KG/M2 | WEIGHT: 185.38 LBS | OXYGEN SATURATION: 96 % | DIASTOLIC BLOOD PRESSURE: 76 MMHG | RESPIRATION RATE: 18 BRPM

## 2018-09-05 DIAGNOSIS — I26.99 OTHER PULMONARY EMBOLISM WITHOUT ACUTE COR PULMONALE, UNSPECIFIED CHRONICITY: ICD-10-CM

## 2018-09-05 DIAGNOSIS — I49.5 SICK SINUS SYNDROME: ICD-10-CM

## 2018-09-05 DIAGNOSIS — Z86.711 HX PULMONARY EMBOLISM: ICD-10-CM

## 2018-09-05 DIAGNOSIS — J84.10 PULMONARY FIBROSIS, POSTINFLAMMATORY: Primary | ICD-10-CM

## 2018-09-05 DIAGNOSIS — R06.02 SOB (SHORTNESS OF BREATH): ICD-10-CM

## 2018-09-05 DIAGNOSIS — R06.02 SHORTNESS OF BREATH: ICD-10-CM

## 2018-09-05 DIAGNOSIS — I27.82 OTHER CHRONIC PULMONARY EMBOLISM WITHOUT ACUTE COR PULMONALE: ICD-10-CM

## 2018-09-05 PROBLEM — Z85.72 HISTORY OF B-CELL LYMPHOMA: Status: ACTIVE | Noted: 2018-09-05

## 2018-09-05 PROBLEM — D75.1 ERYTHROCYTOSIS: Status: ACTIVE | Noted: 2017-11-01

## 2018-09-05 LAB
BRPFT: ABNORMAL
DLCO ADJ PRE: 12.59 ML/(MIN*MMHG) (ref 17.37–31.22)
DLCO PRE: 12.59 ML/(MIN*MMHG) (ref 17.37–31.22)
DLCO SINGLE BREATH LLN: 17.37
DLCO SINGLE BREATH PRE REF: 51.8 %
DLCO SINGLE BREATH REF: 24.29
DLCOC SBVA LLN: 2.28
DLCOC SBVA PRE REF: 76.5 %
DLCOC SBVA REF: 3.4
DLCOC SINGLE BREATH LLN: 17.37
DLCOC SINGLE BREATH PRE REF: 51.8 %
DLCOC SINGLE BREATH REF: 24.29
DLCOVA LLN: 2.28
DLCOVA PRE REF: 76.5 %
DLCOVA PRE: 2.6 ML/(MIN*MMHG*L) (ref 2.28–4.52)
DLCOVA REF: 3.4
DLVAADJ PRE: 2.6 ML/(MIN*MMHG*L) (ref 2.28–4.52)
ERV LLN: 0.87
ERV PRE REF: 157 %
ERV PRE: 1.36 L (ref 0.87–0.87)
ERV REF: 0.87
ERVN2 LLN: 0.87
ERVN2 REF: 0.87
FEF 25 75 CHG: 4 %
FEF 25 75 LLN: 0.64
FEF 25 75 POST REF: 59.5 %
FEF 25 75 POST: 1.1 L/S (ref 0.64–3.04)
FEF 25 75 PRE REF: 57.2 %
FEF 25 75 PRE: 1.05 L/S (ref 0.64–3.04)
FEF 25 75 REF: 1.84
FET100 CHG: 1 %
FET100 POST: 15.93 SEC
FET100 PRE: 15.77 SEC
FEV1 CHG: -1 %
FEV1 FVC CHG: 2.6 %
FEV1 FVC LLN: 58
FEV1 FVC POST REF: 87.9 %
FEV1 FVC POST: 65.02 % (ref 58.48–89.49)
FEV1 FVC PRE REF: 85.7 %
FEV1 FVC PRE: 63.4 % (ref 58.48–89.49)
FEV1 FVC REF: 74
FEV1 LLN: 1.84
FEV1 POST REF: 93.4 %
FEV1 POST: 2.54 L (ref 1.84–3.6)
FEV1 PRE REF: 94.3 %
FEV1 PRE: 2.57 L (ref 1.84–3.6)
FEV1 REF: 2.72
FEV6 CHG: -3 %
FEV6 LLN: 2.64
FEV6 POST REF: 99.2 %
FEV6 POST: 3.52 L (ref 2.64–4.45)
FEV6 PRE REF: 102.3 %
FEV6 PRE: 3.63 L (ref 2.64–4.45)
FEV6 REF: 3.55
FRCN2 LLN: 2.85
FRCN2 REF: 3.84
FRCPL PRE: 3.53 L
FRCPLETH LLN: 2.85
FRCPLETH PREREF: 92 %
FRCPLETH REF: 3.84
FVC CHG: -3.5 %
FVC LLN: 2.64
FVC POST REF: 104.9 %
FVC POST: 3.91 L (ref 2.64–4.8)
FVC PRE REF: 108.7 %
FVC PRE: 4.05 L (ref 2.64–4.8)
FVC REF: 3.72
IVC PRE: 3.53 L (ref 2.64–4.8)
IVC SINGLE BREATH LLN: 2.64
IVC SINGLE BREATH PRE REF: 94.9 %
IVC SINGLE BREATH REF: 3.72
MVV LLN: 92
MVV PRE REF: 76 %
MVV PRE: 82 L/MIN (ref 91.77–124.15)
MVV REF: 108
PEF CHG: 0.4 %
PEF LLN: 4.27
PEF POST REF: 144.5 %
PEF POST: 9.53 L/S (ref 4.27–8.91)
PEF PRE REF: 143.9 %
PEF PRE: 9.49 L/S (ref 4.27–8.91)
PEF REF: 6.59
RAW LLN: 3.06
RAW PRE REF: 83.5 %
RAW PRE: 2.56 CMH2O*S/L (ref 3.06–3.06)
RAW REF: 3.06
RV LLN: 2.3
RV PRE REF: 76.5 %
RV PRE: 2.27 L (ref 2.3–3.65)
RV REF: 2.97
RVN2 LLN: 2.3
RVN2 REF: 2.97
RVN2TLCN2 LLN: 38
RVN2TLCN2 REF: 47
RVTLC LLN: 38
RVTLC PRE REF: 76.3 %
RVTLC PRE: 35.96 % (ref 38.13–56.09)
RVTLC REF: 47
TLC LLN: 5.99
TLC PRE REF: 88.5 %
TLC PRE: 6.32 L (ref 5.99–8.29)
TLC REF: 7.14
TLCN2 LLN: 5.99
TLCN2 REF: 7.14
VA PRE: 4.84 L (ref 6.99–6.99)
VA SINGLE BREATH LLN: 6.99
VA SINGLE BREATH PRE REF: 69.2 %
VA SINGLE BREATH REF: 6.99
VC LLN: 2.64
VC PRE REF: 108.7 %
VC PRE: 4.05 L (ref 2.64–4.8)
VC REF: 3.72
VCMAXN2 LLN: 2.64
VCMAXN2 REF: 3.72
VTGRAWPRE: 4.03 L

## 2018-09-05 PROCEDURE — 94060 EVALUATION OF WHEEZING: CPT | Mod: PBBFAC,PO

## 2018-09-05 PROCEDURE — 99214 OFFICE O/P EST MOD 30 MIN: CPT | Mod: PBBFAC,25,PO | Performed by: INTERNAL MEDICINE

## 2018-09-05 PROCEDURE — 99999 PR PBB SHADOW E&M-EST. PATIENT-LVL IV: CPT | Mod: PBBFAC,,, | Performed by: INTERNAL MEDICINE

## 2018-09-05 PROCEDURE — 94729 DIFFUSING CAPACITY: CPT | Mod: PBBFAC,PO

## 2018-09-05 PROCEDURE — 71046 X-RAY EXAM CHEST 2 VIEWS: CPT | Mod: TC,FY,PO

## 2018-09-05 PROCEDURE — 3074F SYST BP LT 130 MM HG: CPT | Mod: CPTII,,, | Performed by: INTERNAL MEDICINE

## 2018-09-05 PROCEDURE — 71046 X-RAY EXAM CHEST 2 VIEWS: CPT | Mod: 26,,, | Performed by: RADIOLOGY

## 2018-09-05 PROCEDURE — 94729 DIFFUSING CAPACITY: CPT | Mod: 26,S$PBB,, | Performed by: INTERNAL MEDICINE

## 2018-09-05 PROCEDURE — 1101F PT FALLS ASSESS-DOCD LE1/YR: CPT | Mod: CPTII,,, | Performed by: INTERNAL MEDICINE

## 2018-09-05 PROCEDURE — 94726 PLETHYSMOGRAPHY LUNG VOLUMES: CPT | Mod: PBBFAC,PO

## 2018-09-05 PROCEDURE — 94060 EVALUATION OF WHEEZING: CPT | Mod: 26,S$PBB,, | Performed by: INTERNAL MEDICINE

## 2018-09-05 PROCEDURE — 99214 OFFICE O/P EST MOD 30 MIN: CPT | Mod: 25,S$PBB,, | Performed by: INTERNAL MEDICINE

## 2018-09-05 PROCEDURE — 3078F DIAST BP <80 MM HG: CPT | Mod: CPTII,,, | Performed by: INTERNAL MEDICINE

## 2018-09-05 PROCEDURE — 94726 PLETHYSMOGRAPHY LUNG VOLUMES: CPT | Mod: 26,S$PBB,, | Performed by: INTERNAL MEDICINE

## 2018-09-05 RX ORDER — ALBUTEROL SULFATE 90 UG/1
2 AEROSOL, METERED RESPIRATORY (INHALATION) EVERY 6 HOURS
Qty: 1 INHALER | Refills: 12 | Status: SHIPPED | OUTPATIENT
Start: 2018-09-05 | End: 2019-09-05

## 2018-09-05 NOTE — PROGRESS NOTES
Subjective:       Patient ID: Viral Ortega Jr. is a 85 y.o. male.    Chief Complaint: He       pulmonary embolism    HPI       Dyspnea  Patient complains of shortness of breath. Symptoms occur after one flight stairs, with one block walking. Symptoms began 10 years ago, gradually worsening since. Associated symptoms include  leg weakness. He denies chest pain, located left chest. He does not have had recent travel. Weight has been stable. Symptoms are exacerbated by moderate activity. Symptoms are alleviated by rest.   C/o of neuropathy in legs    Hx of Pulmonary Embolism :  About 4-5 years ago, now with filter on eliquis    Past Medical History:   Diagnosis Date    Anemia     Anticoagulant long-term use     BPH (benign prostatic hyperplasia)     Cancer     Non-hodgkins lymphoma    Colitis     DVT (deep venous thrombosis)     GERD (gastroesophageal reflux disease)     Hypertension     Orthostatic hypotension dysautonomic syndrome     Peripheral neuropathy     Pulmonary fibrosis     Shingles     Sick sinus syndrome     UC (ulcerative colitis)      Past Surgical History:   Procedure Laterality Date    APPENDECTOMY      CARDIAC PACEMAKER PLACEMENT      CARDIAC PACEMAKER PLACEMENT      CATARACT EXTRACTION      COLONOSCOPY      STEFANO FILTER PLACEMENT  Jan . 2015    INGUINAL HERNIA REPAIR Right     KNEE SURGERY Right     MEDIPORT INSERTION, DOUBLE      TONSILLECTOMY       Social History     Socioeconomic History    Marital status:      Spouse name: Not on file    Number of children: Not on file    Years of education: Not on file    Highest education level: Not on file   Social Needs    Financial resource strain: Not on file    Food insecurity - worry: Not on file    Food insecurity - inability: Not on file    Transportation needs - medical: Not on file    Transportation needs - non-medical: Not on file   Occupational History    Not on file   Tobacco Use    Smoking status:  Never Smoker    Smokeless tobacco: Never Used   Substance and Sexual Activity    Alcohol use: No    Drug use: No    Sexual activity: No   Other Topics Concern    Not on file   Social History Narrative         Review of Systems   Constitutional: Negative for fever and fatigue.   HENT: Negative for postnasal drip and rhinorrhea.    Eyes: Negative for redness and itching.   Respiratory: Positive for shortness of breath. Negative for cough, wheezing, dyspnea on extertion and Paroxysmal Nocturnal Dyspnea.    Cardiovascular: Negative for chest pain.   Genitourinary: Negative for difficulty urinating and hematuria.   Endocrine: Negative for polyphagia, cold intolerance and heat intolerance.    Musculoskeletal: Negative for arthralgias.   Skin: Negative for rash.   Gastrointestinal: Negative for nausea, vomiting, abdominal pain and abdominal distention.   Neurological: Negative for dizziness and headaches.   Hematological: Negative for adenopathy. Does not bruise/bleed easily and no excessive bruising.   Psychiatric/Behavioral: The patient is not nervous/anxious.        Objective:      Physical Exam   Constitutional: He is oriented to person, place, and time. He appears well-developed and well-nourished.   HENT:   Head: Normocephalic and atraumatic.   Mouth/Throat: Oropharyngeal exudate present.   Eyes: Conjunctivae are normal. Pupils are equal, round, and reactive to light.   Neck: Neck supple. No JVD present. No tracheal deviation present. No thyromegaly present.   Cardiovascular: Regular rhythm.  Occasional extrasystoles are present. Bradycardia present.   Murmur heard.   Systolic murmur is present with a grade of 2/6.  Pulmonary/Chest: Effort normal. No respiratory distress. He has decreased breath sounds. He has no wheezes. He has no rhonchi. He has rales in the right lower field and the left lower field. He exhibits no tenderness.   Abdominal: Soft. Bowel sounds are normal.   Musculoskeletal: Normal range  of motion. He exhibits no edema or tenderness.   Lymphadenopathy:     He has no cervical adenopathy.   Neurological: He is alert and oriented to person, place, and time.   Skin: Skin is warm and dry.   Nursing note and vitals reviewed.    Personal Diagnostic Review  Chest x-ray: lower lobe fibrosis  Pulmonary Function Studies: mild decrease DLCO    Office Spirometry Results:     Pulmonary Function Tests 9/5/2018   SpO2 96   Height 70.000   Weight 2966.4   BMI (Calculated) 26.7   Some recent data might be hidden     Pulmonary Studies Review 9/5/2018   SpO2 96   Height 70.000   Weight 2966.4   BMI (Calculated) 26.7   Predicted Distance 247.31   Predicted Distance Meters (Calculated) 462.24         Assessment:       1. Pulmonary fibrosis, postinflammatory    2. Hx pulmonary embolism    3. SOB (shortness of breath)    4. Shortness of breath    5. Sick sinus syndrome        Outpatient Encounter Medications as of 9/5/2018   Medication Sig Dispense Refill    apixaban (ELIQUIS) 2.5 mg Tab Take 2.5 mg by mouth 2 (two) times daily.      flecainide (TAMBOCOR) 50 MG Tab Take 100 mg by mouth once daily.       gabapentin (NEURONTIN) 100 MG capsule Take 400 mg by mouth 2 (two) times daily.       mesalamine (DELZICOL) 400 mg CpDR EC capsule Take 2,400 mg by mouth once daily.       methylPREDNISolone (MEDROL DOSEPACK) 4 mg tablet Take as directed. 1 Package 0    midodrine (PROAMATINE) 10 MG tablet Take 10 mg by mouth 3 (three) times daily.      pantoprazole (PROTONIX) 40 MG tablet       PHENobarbital (LUMINAL) 16.2 MG tablet Take 16.2 mg by mouth 2 (two) times daily.      tamsulosin (FLOMAX) 0.4 mg Cp24 Take 0.4 mg by mouth once daily.      [DISCONTINUED] clindamycin (CLEOCIN) 300 MG capsule Take 1 capsule (300 mg total) by mouth every 8 (eight) hours. 21 capsule 0    albuterol 90 mcg/actuation inhaler Inhale 2 puffs into the lungs every 6 (six) hours. 1 Inhaler 12     No facility-administered encounter medications on  file as of 9/5/2018.      Orders Placed This Encounter   Procedures    X-Ray Chest PA And Lateral     Standing Status:   Future     Standing Expiration Date:   9/5/2019     Order Specific Question:   May the Radiologist modify the order per protocol to meet the clinical needs of the patient?     Answer:   Yes    Complete PFT with bronchodilator     Standing Status:   Future     Standing Expiration Date:   3/5/2020     Plan:       Requested Prescriptions     Signed Prescriptions Disp Refills    albuterol 90 mcg/actuation inhaler 1 Inhaler 12     Sig: Inhale 2 puffs into the lungs every 6 (six) hours.     Pulmonary fibrosis, postinflammatory  -     Complete PFT with bronchodilator; Future; Expected date: 09/05/2018  -     X-Ray Chest PA And Lateral; Future; Expected date: 09/05/2018    Hx pulmonary embolism  -     Complete PFT with bronchodilator; Future; Expected date: 09/05/2018  -     X-Ray Chest PA And Lateral; Future; Expected date: 09/05/2018    SOB (shortness of breath)  -     albuterol 90 mcg/actuation inhaler; Inhale 2 puffs into the lungs every 6 (six) hours.  Dispense: 1 Inhaler; Refill: 12  -     Complete PFT with bronchodilator; Future; Expected date: 09/05/2018  -     X-Ray Chest PA And Lateral; Future; Expected date: 09/05/2018    Shortness of breath    Sick sinus syndrome           Follow-up in about 1 year (around 9/5/2019) for Review PFT and CXR.    MEDICAL DECISION MAKING: Moderate to high complexity.  Overall, the multiple problems listed are of moderate to high severity that may impact quality of life and activities of daily living. Side effects of medications, treatment plan as well as options and alternatives reviewed and discussed with patient. There was counseling of patient concerning these issues.    Total time spent in face to face counseling and coordination of care - 25  minutes over 50% of time was used in discussion of prognosis, risks, benefits of treatment, instructions and  compliance with regimen . Discussion with other physicians or health care providers (DME, NP, pharmacy, respiratory therapy) occurred.

## 2018-09-05 NOTE — PATIENT INSTRUCTIONS
Albuterol inhalation aerosol  What is this medicine?  ALBUTEROL (al BYOO ter ole) is a bronchodilator. It helps open up the airways in your lungs to make it easier to breathe. This medicine is used to treat and to prevent bronchospasm.  How should I use this medicine?  This medicine is for inhalation through the mouth. Follow the directions on your prescription label. Take your medicine at regular intervals. Do not use more often than directed. Make sure that you are using your inhaler correctly. Ask you doctor or health care provider if you have any questions.  Talk to your pediatrician regarding the use of this medicine in children. Special care may be needed.  What side effects may I notice from receiving this medicine?  Side effects that you should report to your doctor or health care professional as soon as possible:  · allergic reactions like skin rash, itching or hives, swelling of the face, lips, or tongue  · breathing problems  · chest pain  · feeling faint or lightheaded, falls  · high blood pressure  · irregular heartbeat  · fever  · muscle cramps or weakness  · pain, tingling, numbness in the hands or feet  · vomiting  Side effects that usually do not require medical attention (report to your doctor or health care professional if they continue or are bothersome):  · cough  · difficulty sleeping  · headache  · nervousness or trembling  · stomach upset  · stuffy or runny nose  · throat irritation  · unusual taste  What may interact with this medicine?  · anti-infectives like chloroquine and pentamidine  · caffeine  · cisapride  · diuretics  · medicines for colds  · medicines for depression or for emotional or psychotic conditions  · medicines for weight loss including some herbal products  · methadone  · some antibiotics like clarithromycin, erythromycin, levofloxacin, and linezolid  · some heart medicines  · steroid hormones like dexamethasone, cortisone, hydrocortisone  · theophylline  · thyroid  hormones  What if I miss a dose?  If you miss a dose, use it as soon as you can. If it is almost time for your next dose, use only that dose. Do not use double or extra doses.  Where should I keep my medicine?  Keep out of the reach of children.  Store at room temperature between 15 and 30 degrees C (59 and 86 degrees F). The contents are under pressure and may burst when exposed to heat or flame. Do not freeze. This medicine does not work as well if it is too cold. Throw away any unused medicine after the expiration date. Inhalers need to be thrown away after the labeled number of puffs have been used or by the expiration date; whichever comes first. Ventolin HFA should be thrown away 12 months after removing from foil pouch. Check the instructions that come with your medicine.  What should I tell my health care provider before I take this medicine?  They need to know if you have any of the following conditions:  · diabetes  · heart disease or irregular heartbeat  · high blood pressure  · pheochromocytoma  · seizures  · thyroid disease  · an unusual or allergic reaction to albuterol, levalbuterol, sulfites, other medicines, foods, dyes, or preservatives  · pregnant or trying to get pregnant  · breast-feeding  What should I watch for while using this medicine?  Tell your doctor or health care professional if your symptoms do not improve. Do not use extra albuterol. If your asthma or bronchitis gets worse while you are using this medicine, call your doctor right away.  If your mouth gets dry try chewing sugarless gum or sucking hard candy. Drink water as directed.  NOTE:This sheet is a summary. It may not cover all possible information. If you have questions about this medicine, talk to your doctor, pharmacist, or health care provider. Copyright© 2017 [employer]        Inhaler Use  The inhaler that you were prescribed contains a potent medicine. It should only be used as directed. The medicine in your inhaler must  be breathed deeply into your lungs for it to work. It will not work at all if it only reaches your mouth and throat. Follow the instructions below for best results. And remember to follow your asthma action plan as given to you by your doctor.  1. Keep your inhaler at room temperature.  2. Hold the inhaler so that the part that goes into your mouth is at the bottom.  3. Shake the inhaler well and remove the cap.  4. Breathe out through your mouth to fully empty your lungs.  5. Place the inhaler in your mouth and close your lips tightly around it. (Or hold the inhaler 1 to 2 inches from your open mouth if told to do so by your healthcare provider.)  6. Squeeze the inhaler as you breathe in slowly through your mouth until your lungs are full of air, drawing the medicine deep into your lungs.  7. Hold your breath for 10 seconds, or as long as you can comfortably hold your breath. Then breathe out slowly.  8. If you have been advised to take 2 puffs, wait 5 minutes, then repeat steps 3-7 above. Waiting 5 minutes between puffs will alow the medicine to open up your lungs so the second puff can get deeper into the lungs. Replace the cap when done.  9. If you were prescribed both a steroid inhaler and a bronchodilator inhaler, use the bronchodilator first to open the air passages. Wait 5 minutes, then use the steroid inhaler.  10. Rinse your mouth with water and spit it out (especially after using a steroid inhaler). This is very important if you are using a steroid inhaler to prevent thrush, a mild yeast infection of the mouth and back of the throat.  11. A special chamber (spacer) may be prescribed that attaches to your inhaler. This increases the amount of medicine that goes to your lungs. It also improves how well each treatment works. Ask your doctor about this if you did not receive one.    Keep it clean  Remove the metal canister and do not immerse it in water. Then clean the plastic mouthpiece, cap, and spacer if  you have one, by rinsing them well in warm running water for 30 to 60 seconds. Shake off excess water and allow the mouthpiece to dry completely (overnight is recommended). This should be done once a week. If you need the inhaler before the mouthpiece is dry, shake off excess water, replace canister, and test spray 2 times (away from the face).  Warning  A steroid inhaler is used to prevent an asthma attack. Do not use this to treat an acute wheezing episode. Use only bronchodilator inhalers (quick relief) to treat an acute asthma attack.  If you find that your medicine is not working and you need to use it more often than prescribed, this could be a sign that your asthma is getting worse. Go to the emergency room or urgent care right away. An asthma attack is easiest to treat in the early stages before it becomes severe.  When to seek medical advice  Get prompt medical attention if any of the following occur:  · Increased wheezing or shortness of breath  · Need to use your inhalers more often than usual without relief  · Fever of 100.4°F (38°C) or higher, or as directed by your healthcare provider  · Coughing up lots of dark-colored or bloody sputum (mucus)  · Chest pain with each breath  · Blue lips or fingernails  · Peak flow reading less than 50% of your normal best  Date Last Reviewed: 12/2/2015  © 4569-9480 [employer]. [address] [place] [address]. All rights reserved. This information is not intended as a substitute for professional medical care. Always follow your healthcare professional's instructions.        Step-by-Step  Using an Inhaler Without a Spacer       Date Last Reviewed: 2/1/2017  © 9790-0649 [employer]. [address] [place] [address]. All rights reserved. This information is not intended as a substitute for professional medical care. Always follow your healthcare professional's instructions.

## 2018-09-05 NOTE — LETTER
September 5, 2018      Narinder Rivera MD  [address] [place] [id number]           [facility]  [address] [place] [address]  Phone: [phone]  Fax: [phone]          Patient: Viral Ortega Jr.   MR Number: [id number]   YOB: 1933   Date of Visit: 9/5/2018       Dear No ref. provider found:    Thank you for referring Viral Ortega to me for evaluation. Attached you will find relevant portions of my assessment and plan of care.    If you have questions, please do not hesitate to call me. I look forward to following Viral Ortega along with you.    Sincerely,    Narinder Tubbs MD    Enclosure  CC:  No Recipients    IIf you would like to receive this communication electronically, please contact [email or web address] or [phone] to request [employer] access.    [employer] is a tool which provides read-only access to select patient information with whom you have a relationship. Its easy to use and provides real time access to review your patients record including encounter summaries, notes, results, and demographic information.    If you feel you have received this communication in error or would no longer like to receive these types of communications, please e-mail [email or web address]

## 2018-09-19 ENCOUNTER — TELEPHONE (OUTPATIENT)
Dept: AUDIOLOGY | Facility: CLINIC | Age: 83
End: 2018-09-19

## 2018-09-19 NOTE — TELEPHONE ENCOUNTER
Scheduled audio and ENT appointments for 9/24/2018.    ----- Message from Fede Segal sent at 9/19/2018  2:25 PM CDT -----  Type:  Sooner Apoointment Request    Caller is requesting a sooner appointment.  Caller declined first available appointment listed below.  Caller will not accept being placed on the waitlist and is requesting a message be sent to doctor.    Name of Caller:  Self [phone]  When is the first available appointment?  NA   Symptoms:  NA   Best Call Back Number:  Additional Information: Patient requesting to schedule hearing test.

## 2018-09-24 ENCOUNTER — OFFICE VISIT (OUTPATIENT)
Dept: OTOLARYNGOLOGY | Facility: CLINIC | Age: 83
End: 2018-09-24
Payer: MEDICARE

## 2018-09-24 ENCOUNTER — CLINICAL SUPPORT (OUTPATIENT)
Dept: AUDIOLOGY | Facility: CLINIC | Age: 83
End: 2018-09-24
Payer: MEDICARE

## 2018-09-24 VITALS
SYSTOLIC BLOOD PRESSURE: 155 MMHG | DIASTOLIC BLOOD PRESSURE: 97 MMHG | WEIGHT: 185.19 LBS | HEART RATE: 89 BPM | HEIGHT: 70 IN | TEMPERATURE: 98 F | BODY MASS INDEX: 26.51 KG/M2

## 2018-09-24 DIAGNOSIS — H91.90 PERCEIVED HEARING LOSS: ICD-10-CM

## 2018-09-24 DIAGNOSIS — H81.11 BPPV (BENIGN PAROXYSMAL POSITIONAL VERTIGO), RIGHT: Primary | ICD-10-CM

## 2018-09-24 PROCEDURE — 99999 PR PBB SHADOW E&M-EST. PATIENT-LVL III: CPT | Mod: PBBFAC,,, | Performed by: ORTHOPAEDIC SURGERY

## 2018-09-24 PROCEDURE — 95992 CANALITH REPOSITIONING PROC: CPT | Mod: S$PBB,RT,, | Performed by: ORTHOPAEDIC SURGERY

## 2018-09-24 PROCEDURE — 92542 POSITIONAL NYSTAGMUS TEST: CPT | Mod: 26,S$PBB,, | Performed by: AUDIOLOGIST-HEARING AID FITTER

## 2018-09-24 PROCEDURE — 95992 CANALITH REPOSITIONING PROC: CPT | Mod: PBBFAC | Performed by: ORTHOPAEDIC SURGERY

## 2018-09-24 PROCEDURE — 1101F PT FALLS ASSESS-DOCD LE1/YR: CPT | Mod: CPTII,,, | Performed by: ORTHOPAEDIC SURGERY

## 2018-09-24 PROCEDURE — 3077F SYST BP >= 140 MM HG: CPT | Mod: CPTII,,, | Performed by: ORTHOPAEDIC SURGERY

## 2018-09-24 PROCEDURE — 99204 OFFICE O/P NEW MOD 45 MIN: CPT | Mod: 25,S$PBB,, | Performed by: ORTHOPAEDIC SURGERY

## 2018-09-24 PROCEDURE — 92542 POSITIONAL NYSTAGMUS TEST: CPT | Mod: PBBFAC | Performed by: AUDIOLOGIST-HEARING AID FITTER

## 2018-09-24 PROCEDURE — 99213 OFFICE O/P EST LOW 20 MIN: CPT | Mod: PBBFAC | Performed by: ORTHOPAEDIC SURGERY

## 2018-09-24 PROCEDURE — 3080F DIAST BP >= 90 MM HG: CPT | Mod: CPTII,,, | Performed by: ORTHOPAEDIC SURGERY

## 2018-09-24 NOTE — PROGRESS NOTES
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  Patient declined audio testing today.   Patient complains of spinning sensations when he lays down on the right side. He reports also having balance difficulties that have lasted for 20+ years. He reports spinning sensations last minutes. He has previously been diagnosed with BPPV to the right  As well as gait disturbances.    Oculomotor function tests (sinusoidal tracking, saccade and OPKs) were normal and symmetric.  Spontaneous nystagmus was absent.  Gaze nystagmus was absent.  Head-shake test was absent for after head shake nystagmus.  Grinnell-Hallpike Left was negative for BPPV. 4 d/s RB nystagmus observed.  Silvana-Hallpike Right was Positive for BPPV. 8 d/s LB + 21 d/s UB tortional nystagmus observed and fatigued.  Static Positional nystagmus:   Head Right: Absent   Head Center: Absent   Head Left: 4 d/s RB nystagmus    A 5-position Epley maneuver was performed for the right.  Patient tolerated the maneuver well and was asymptomatic upon discharge.  Post-Epley home instructions were reviewed and given to the patient.  Understanding was voiced.    Impressions: Right BPPV.     Rec:  1. ENT f/u  2. Returni n 1 week to ensure right BPPV has resolved.    Tracings are scanned into computer.

## 2018-09-24 NOTE — PROGRESS NOTES
"Subjective:       Patient ID: Viral Ortega Jr. is a 85 y.o. male.    Chief Complaint: Follow-up    Patient is a very pleasant 85 y.o. male here to see me today for the first time for evaluation of dizziness.   He reports that the symptoms have been present for "a while," he is unable to clarify at all how long it has been an issue.  On average, the patent reports symptoms that occur approximately 1 time each days, primarily when he lays down to his left.  He describes the dizziness as whirling and a sensation of movement of surroundings and says that it lasts minutes.  He has noted that laying down and turning to the left acts as a trigger.  He denies aural pressure, otalgia, otorrhea, tinnitus and hearing loss.  He has not started any new medications, and has not had any recent dietary changes.  He denies hearing loss, but his wife has complained that he is not hearing well.  He declined an audiogram today.        Review of Systems   Constitutional: Negative for fatigue, fever and unexpected weight change.   HENT: Positive for hearing loss. Negative for congestion, ear discharge, ear pain, facial swelling, nosebleeds, postnasal drip, rhinorrhea, sinus pressure, sneezing, sore throat, tinnitus, trouble swallowing and voice change.    Eyes: Negative for discharge, redness and itching.   Respiratory: Positive for shortness of breath. Negative for cough, choking and wheezing.    Cardiovascular: Negative for chest pain and palpitations.   Gastrointestinal: Negative for abdominal pain.        No reflux.   Musculoskeletal: Negative for neck pain.   Neurological: Positive for dizziness and light-headedness. Negative for facial asymmetry and headaches.   Hematological: Negative for adenopathy. Does not bruise/bleed easily.   Psychiatric/Behavioral: Negative for agitation, behavioral problems, confusion and decreased concentration.       Objective:      Physical Exam   Constitutional: He is oriented to person, place, and " time. Vital signs are normal. He appears well-developed and well-nourished. No distress.   HENT:   Head: Normocephalic and atraumatic.   Right Ear: Hearing, tympanic membrane, external ear and ear canal normal.   Left Ear: Hearing, tympanic membrane, external ear and ear canal normal.   Nose: Nose normal. No mucosal edema, rhinorrhea, nasal deformity or septal deviation.   Mouth/Throat: Uvula is midline, oropharynx is clear and moist and mucous membranes are normal. No trismus in the jaw. Normal dentition. No uvula swelling. No oropharyngeal exudate or posterior oropharyngeal edema.   Eyes: Conjunctivae and EOM are normal. Pupils are equal, round, and reactive to light. Right eye exhibits no chemosis. Left eye exhibits no chemosis. Right conjunctiva is not injected. Left conjunctiva is not injected. No scleral icterus.   Neck: Trachea normal and phonation normal. No tracheal tenderness present. No tracheal deviation present. No thyroid mass and no thyromegaly present.   Cardiovascular: Intact distal pulses.   Pulmonary/Chest: Effort normal. No accessory muscle usage or stridor. No respiratory distress.   Lymphadenopathy:        Head (right side): No submental, no submandibular, no preauricular and no posterior auricular adenopathy present.        Head (left side): No submental, no submandibular, no preauricular and no posterior auricular adenopathy present.     He has no cervical adenopathy.        Right cervical: No superficial cervical and no deep cervical adenopathy present.       Left cervical: No superficial cervical and no deep cervical adenopathy present.   Neurological: He is alert and oriented to person, place, and time. No cranial nerve deficit.   Skin: Skin is warm and dry. No rash noted. No erythema.   Psychiatric: He has a normal mood and affect. His behavior is normal. Thought content normal.         Viral Ortega Jr. was seen 09/24/2018 for Epley maneuver for BPPV in the right ear.      A 5-position  Epley maneuver was performed for the right.  Patient tolerated the maneuver well and was asymptomatic upon discharge.  No nystagmus seen on post-procedure examination.  Post-Epley home instructions were reviewed and given to the patient.  Understanding was voiced.          Assessment:       1. BPPV (benign paroxysmal positional vertigo), right    2. Perceived hearing loss        Plan:       1.  BPPV: We had a long discussion regarding the relevant anatomy and pathology relevant to BPPV.  We discussed that in the ear there are three semicircular canals that detect rotational movement.  BPPV occurs as a result of otoconia, tiny crystals of calcium carbonate that are a normal part of the inner ears anatomy, detaching from their normal anatomic position and collecting in one of the semicircular canals.  When the head moves, the otoconia shift. This stimulates the cupula to send false signals to the brain, producing vertigo and triggering nystagmus (involuntary eye movements). He has had CRM of the right ear, RTC 1-2 weeks for recheck.  He was given postprocedure instructions.  2.  Perceived hearing loss:  He is now open to audiogram, will schedule at the time of his return visit.

## 2018-09-24 NOTE — LETTER
September 24, 2018      Narinder Rivera MD  [address] [address]           [facility]  [address] [place] [address]  Phone: [phone]  Fax: [phone]          Patient: Viral Ortega Jr.   MR Number: [id number]   YOB: 1933   Date of Visit: 9/24/2018       Dear Dr. Narinder Rivera:    Thank you for referring Viral Ortega to me for evaluation. Attached you will find relevant portions of my assessment and plan of care.    If you have questions, please do not hesitate to call me. I look forward to following Viral Ortega along with you.    Sincerely,    Mamie Calle MD    Enclosure  CC:  No Recipients    If you would like to receive this communication electronically, please contact [email or web address] or [phone] to request more information on [employer] access.    For providers and/or their staff who would like to refer a patient to [facility], please contact us through our one-stop-shop provider referral line, [facility], at [phone].    If you feel you have received this communication in error or would no longer like to receive these types of communications, please e-mail [email or web address]

## 2018-10-01 ENCOUNTER — CLINICAL SUPPORT (OUTPATIENT)
Dept: AUDIOLOGY | Facility: CLINIC | Age: 83
End: 2018-10-01
Payer: MEDICARE

## 2018-10-01 DIAGNOSIS — H90.3 SENSORINEURAL HEARING LOSS, BILATERAL: ICD-10-CM

## 2018-10-01 DIAGNOSIS — H93.13 TINNITUS, BILATERAL: ICD-10-CM

## 2018-10-01 DIAGNOSIS — H81.11 BPPV (BENIGN PAROXYSMAL POSITIONAL VERTIGO), RIGHT: Primary | ICD-10-CM

## 2018-10-01 PROCEDURE — 92557 COMPREHENSIVE HEARING TEST: CPT | Mod: PBBFAC | Performed by: AUDIOLOGIST-HEARING AID FITTER

## 2018-10-01 PROCEDURE — 92567 TYMPANOMETRY: CPT | Mod: PBBFAC | Performed by: AUDIOLOGIST-HEARING AID FITTER

## 2018-10-01 NOTE — PROGRESS NOTES
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  Patient was treated for right BPPV on 9/24/2018. He received an Epley Maneuver that day. Today he denies symptoms of spinning.He has a history of hearing loss [facility]. 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 He reports constant high frequency tinnitus. He denies otalgia and aural fullness.    Results reveal a mild-to-profound sensorineural hearing loss 250-8000 Hz for the right ear, and a mild-to-profound sensorineural hearing loss 250-8000 Hz for the left ear.   Speech Reception Thresholds were  40 dBHL for the right ear and 30 dBHL for the left ear.   Word recognition scores were fair for the right ear and fair for the left ear.   Tympanograms were Type A, normal for the right ear and Type A, normal for the left ear.          VNG Screen:  Spontaneous test: Absent for nystagmus  Gaze test: Absent for nystagmus  Silvana-Hallpike Right: Positive for BPPV. Upward and leftward tortional nystagmus that fatigued. 7 d/s LB + 5 d/s UB  Chancellor-Hallpike Left: Negative for BPPV.     Summary:     A 5-position Epley maneuver was completed to the right.  Patient tolerated the maneuver well and was asymptomatic upon discharge.  Post Epley instructions were given and reviewed with the patient.  Understanding was voiced.    Patient was counseled on all findings.    Recommendations:  1. Return to clinic if symptoms of dizziness return.  2. Binaural hearing aids. Copy of audiogram was provided to patient today and he was encouraged to contact [facility] to discuss [facility].

## 2018-11-02 ENCOUNTER — LAB VISIT (OUTPATIENT)
Dept: LAB | Facility: HOSPITAL | Age: 83
End: 2018-11-02
Attending: FAMILY MEDICINE
Payer: MEDICARE

## 2018-11-02 DIAGNOSIS — R60.0 LOCALIZED EDEMA: Primary | ICD-10-CM

## 2018-11-02 DIAGNOSIS — R60.0 LOCALIZED EDEMA: ICD-10-CM

## 2018-11-02 LAB
BNP SERPL-MCNC: 468 PG/ML
COMPLEXED PSA SERPL-MCNC: 8.2 NG/ML
ESTIMATED AVG GLUCOSE: 108 MG/DL
HBA1C MFR BLD HPLC: 5.4 %

## 2018-11-02 PROCEDURE — 36415 COLL VENOUS BLD VENIPUNCTURE: CPT | Mod: PO

## 2018-11-02 PROCEDURE — 83880 ASSAY OF NATRIURETIC PEPTIDE: CPT | Mod: PO

## 2018-11-02 PROCEDURE — 83036 HEMOGLOBIN GLYCOSYLATED A1C: CPT

## 2018-11-02 PROCEDURE — 84153 ASSAY OF PSA TOTAL: CPT

## 2018-11-26 ENCOUNTER — HOSPITAL ENCOUNTER (INPATIENT)
Facility: HOSPITAL | Age: 83
LOS: 2 days | Discharge: ANOTHER HOSPITAL | DRG: 552 | End: 2018-11-28
Attending: EMERGENCY MEDICINE | Admitting: HOSPITALIST
Payer: MEDICARE

## 2018-11-26 DIAGNOSIS — R07.9 CHEST PAIN: ICD-10-CM

## 2018-11-26 DIAGNOSIS — S12.101A CLOSED NONDISPLACED FRACTURE OF SECOND CERVICAL VERTEBRA, UNSPECIFIED FRACTURE MORPHOLOGY, INITIAL ENCOUNTER: Primary | ICD-10-CM

## 2018-11-26 DIAGNOSIS — S14.109A INJURY OF CERVICAL SPINE, INITIAL ENCOUNTER: ICD-10-CM

## 2018-11-26 LAB
ALBUMIN SERPL BCP-MCNC: 3.4 G/DL
ALP SERPL-CCNC: 115 U/L
ALT SERPL W/O P-5'-P-CCNC: 22 U/L
ANION GAP SERPL CALC-SCNC: 13 MMOL/L
AST SERPL-CCNC: 33 U/L
BASOPHILS # BLD AUTO: 0.02 K/UL
BASOPHILS NFR BLD: 0.2 %
BILIRUB SERPL-MCNC: 1.1 MG/DL
BUN SERPL-MCNC: 24 MG/DL
CALCIUM SERPL-MCNC: 9.1 MG/DL
CHLORIDE SERPL-SCNC: 107 MMOL/L
CO2 SERPL-SCNC: 18 MMOL/L
CREAT SERPL-MCNC: 1.1 MG/DL
DIFFERENTIAL METHOD: ABNORMAL
EOSINOPHIL # BLD AUTO: 0 K/UL
EOSINOPHIL NFR BLD: 0.1 %
ERYTHROCYTE [DISTWIDTH] IN BLOOD BY AUTOMATED COUNT: 14.9 %
EST. GFR  (AFRICAN AMERICAN): >60 ML/MIN/1.73 M^2
EST. GFR  (NON AFRICAN AMERICAN): >60 ML/MIN/1.73 M^2
GLUCOSE SERPL-MCNC: 101 MG/DL
HCT VFR BLD AUTO: 42 %
HGB BLD-MCNC: 14.2 G/DL
LYMPHOCYTES # BLD AUTO: 1.7 K/UL
LYMPHOCYTES NFR BLD: 18.6 %
MCH RBC QN AUTO: 32.1 PG
MCHC RBC AUTO-ENTMCNC: 33.8 G/DL
MCV RBC AUTO: 95 FL
MONOCYTES # BLD AUTO: 0.8 K/UL
MONOCYTES NFR BLD: 8.9 %
NEUTROPHILS # BLD AUTO: 6.5 K/UL
NEUTROPHILS NFR BLD: 72.2 %
PLATELET # BLD AUTO: 200 K/UL
PMV BLD AUTO: 10.7 FL
POTASSIUM SERPL-SCNC: 4.6 MMOL/L
PROT SERPL-MCNC: 7.1 G/DL
RBC # BLD AUTO: 4.42 M/UL
SODIUM SERPL-SCNC: 138 MMOL/L
TROPONIN I SERPL DL<=0.01 NG/ML-MCNC: 0.03 NG/ML
WBC # BLD AUTO: 9.01 K/UL

## 2018-11-26 PROCEDURE — 96374 THER/PROPH/DIAG INJ IV PUSH: CPT

## 2018-11-26 PROCEDURE — 36415 COLL VENOUS BLD VENIPUNCTURE: CPT

## 2018-11-26 PROCEDURE — 99285 EMERGENCY DEPT VISIT HI MDM: CPT | Mod: 25

## 2018-11-26 PROCEDURE — 11000001 HC ACUTE MED/SURG PRIVATE ROOM

## 2018-11-26 PROCEDURE — 80053 COMPREHEN METABOLIC PANEL: CPT

## 2018-11-26 PROCEDURE — 85025 COMPLETE CBC W/AUTO DIFF WBC: CPT

## 2018-11-26 PROCEDURE — 96375 TX/PRO/DX INJ NEW DRUG ADDON: CPT

## 2018-11-26 PROCEDURE — 51702 INSERT TEMP BLADDER CATH: CPT

## 2018-11-26 PROCEDURE — 93010 ELECTROCARDIOGRAM REPORT: CPT | Mod: ,,, | Performed by: INTERNAL MEDICINE

## 2018-11-26 PROCEDURE — 93005 ELECTROCARDIOGRAM TRACING: CPT

## 2018-11-26 PROCEDURE — 84484 ASSAY OF TROPONIN QUANT: CPT

## 2018-11-26 PROCEDURE — 63600175 PHARM REV CODE 636 W HCPCS: Performed by: EMERGENCY MEDICINE

## 2018-11-26 RX ORDER — ONDANSETRON 2 MG/ML
4 INJECTION INTRAMUSCULAR; INTRAVENOUS
Status: COMPLETED | OUTPATIENT
Start: 2018-11-26 | End: 2018-11-26

## 2018-11-26 RX ORDER — MORPHINE SULFATE 4 MG/ML
4 INJECTION, SOLUTION INTRAMUSCULAR; INTRAVENOUS
Status: COMPLETED | OUTPATIENT
Start: 2018-11-26 | End: 2018-11-26

## 2018-11-26 RX ORDER — MUPIROCIN 20 MG/G
1 OINTMENT TOPICAL
Status: COMPLETED | OUTPATIENT
Start: 2018-11-26 | End: 2018-11-27

## 2018-11-26 RX ADMIN — MORPHINE SULFATE 4 MG: 4 INJECTION INTRAVENOUS at 09:11

## 2018-11-26 RX ADMIN — ONDANSETRON 4 MG: 2 INJECTION INTRAMUSCULAR; INTRAVENOUS at 09:11

## 2018-11-27 PROBLEM — I95.1 ORTHOSTATIC HYPOTENSION: Status: ACTIVE | Noted: 2018-11-27

## 2018-11-27 PROBLEM — S12.101A CLOSED NONDISPLACED FRACTURE OF SECOND CERVICAL VERTEBRA: Status: ACTIVE | Noted: 2018-11-27

## 2018-11-27 LAB
ALBUMIN SERPL BCP-MCNC: 3.3 G/DL
ANION GAP SERPL CALC-SCNC: 11 MMOL/L
APTT BLDCRRT: 29.6 SEC
BASOPHILS # BLD AUTO: 0.04 K/UL
BASOPHILS NFR BLD: 0.4 %
BUN SERPL-MCNC: 23 MG/DL
CALCIUM SERPL-MCNC: 9.1 MG/DL
CHLORIDE SERPL-SCNC: 107 MMOL/L
CO2 SERPL-SCNC: 20 MMOL/L
CREAT SERPL-MCNC: 1.1 MG/DL
DIFFERENTIAL METHOD: ABNORMAL
EOSINOPHIL # BLD AUTO: 0 K/UL
EOSINOPHIL NFR BLD: 0.4 %
ERYTHROCYTE [DISTWIDTH] IN BLOOD BY AUTOMATED COUNT: 14.8 %
EST. GFR  (AFRICAN AMERICAN): >60 ML/MIN/1.73 M^2
EST. GFR  (NON AFRICAN AMERICAN): >60 ML/MIN/1.73 M^2
ESTIMATED AVG GLUCOSE: 105 MG/DL
GLUCOSE SERPL-MCNC: 86 MG/DL
HBA1C MFR BLD HPLC: 5.3 %
HCT VFR BLD AUTO: 41.7 %
HGB BLD-MCNC: 14.3 G/DL
INR PPP: 1
LYMPHOCYTES # BLD AUTO: 2.4 K/UL
LYMPHOCYTES NFR BLD: 26.2 %
MAGNESIUM SERPL-MCNC: 2.2 MG/DL
MCH RBC QN AUTO: 32.5 PG
MCHC RBC AUTO-ENTMCNC: 34.3 G/DL
MCV RBC AUTO: 95 FL
MONOCYTES # BLD AUTO: 1.1 K/UL
MONOCYTES NFR BLD: 12.1 %
NEUTROPHILS # BLD AUTO: 5.7 K/UL
NEUTROPHILS NFR BLD: 60.9 %
PHOSPHATE SERPL-MCNC: 3.6 MG/DL
PHOSPHATE SERPL-MCNC: 3.6 MG/DL
PLATELET # BLD AUTO: 199 K/UL
PMV BLD AUTO: 10.5 FL
POTASSIUM SERPL-SCNC: 4.9 MMOL/L
PROTHROMBIN TIME: 10.6 SEC
RBC # BLD AUTO: 4.4 M/UL
SODIUM SERPL-SCNC: 138 MMOL/L
WBC # BLD AUTO: 9.29 K/UL

## 2018-11-27 PROCEDURE — 25000003 PHARM REV CODE 250: Performed by: INTERNAL MEDICINE

## 2018-11-27 PROCEDURE — 25000003 PHARM REV CODE 250: Performed by: HOSPITALIST

## 2018-11-27 PROCEDURE — S0028 INJECTION, FAMOTIDINE, 20 MG: HCPCS | Performed by: INTERNAL MEDICINE

## 2018-11-27 PROCEDURE — S5010 5% DEXTROSE AND 0.45% SALINE: HCPCS | Performed by: HOSPITALIST

## 2018-11-27 PROCEDURE — 85025 COMPLETE CBC W/AUTO DIFF WBC: CPT

## 2018-11-27 PROCEDURE — 83735 ASSAY OF MAGNESIUM: CPT

## 2018-11-27 PROCEDURE — 63600175 PHARM REV CODE 636 W HCPCS: Performed by: HOSPITALIST

## 2018-11-27 PROCEDURE — 83036 HEMOGLOBIN GLYCOSYLATED A1C: CPT

## 2018-11-27 PROCEDURE — 21400001 HC TELEMETRY ROOM

## 2018-11-27 PROCEDURE — A4216 STERILE WATER/SALINE, 10 ML: HCPCS | Performed by: HOSPITALIST

## 2018-11-27 PROCEDURE — 25000003 PHARM REV CODE 250: Performed by: EMERGENCY MEDICINE

## 2018-11-27 PROCEDURE — 80069 RENAL FUNCTION PANEL: CPT

## 2018-11-27 PROCEDURE — 85730 THROMBOPLASTIN TIME PARTIAL: CPT

## 2018-11-27 PROCEDURE — 85610 PROTHROMBIN TIME: CPT

## 2018-11-27 PROCEDURE — 96376 TX/PRO/DX INJ SAME DRUG ADON: CPT | Mod: 59

## 2018-11-27 RX ORDER — DEXTROSE MONOHYDRATE AND SODIUM CHLORIDE 5; .45 G/100ML; G/100ML
INJECTION, SOLUTION INTRAVENOUS CONTINUOUS
Status: ACTIVE | OUTPATIENT
Start: 2018-11-27 | End: 2018-11-27

## 2018-11-27 RX ORDER — IBUPROFEN 200 MG
16 TABLET ORAL
Status: DISCONTINUED | OUTPATIENT
Start: 2018-11-27 | End: 2018-11-28 | Stop reason: HOSPADM

## 2018-11-27 RX ORDER — MORPHINE SULFATE 4 MG/ML
2 INJECTION, SOLUTION INTRAMUSCULAR; INTRAVENOUS EVERY 6 HOURS PRN
Status: DISCONTINUED | OUTPATIENT
Start: 2018-11-27 | End: 2018-11-28 | Stop reason: HOSPADM

## 2018-11-27 RX ORDER — FAMOTIDINE 10 MG/ML
20 INJECTION INTRAVENOUS DAILY
Status: DISCONTINUED | OUTPATIENT
Start: 2018-11-27 | End: 2018-11-27

## 2018-11-27 RX ORDER — HYDRALAZINE HYDROCHLORIDE 20 MG/ML
10 INJECTION INTRAMUSCULAR; INTRAVENOUS EVERY 8 HOURS PRN
Status: DISCONTINUED | OUTPATIENT
Start: 2018-11-27 | End: 2018-11-28 | Stop reason: HOSPADM

## 2018-11-27 RX ORDER — IBUPROFEN 200 MG
24 TABLET ORAL
Status: DISCONTINUED | OUTPATIENT
Start: 2018-11-27 | End: 2018-11-28 | Stop reason: HOSPADM

## 2018-11-27 RX ORDER — FAMOTIDINE 20 MG/50ML
20 INJECTION, SOLUTION INTRAVENOUS DAILY
Status: DISCONTINUED | OUTPATIENT
Start: 2018-11-27 | End: 2018-11-28

## 2018-11-27 RX ORDER — ENOXAPARIN SODIUM 100 MG/ML
40 INJECTION SUBCUTANEOUS EVERY 24 HOURS
Status: DISCONTINUED | OUTPATIENT
Start: 2018-11-27 | End: 2018-11-28 | Stop reason: HOSPADM

## 2018-11-27 RX ORDER — SODIUM CHLORIDE 0.9 % (FLUSH) 0.9 %
5 SYRINGE (ML) INJECTION
Status: DISCONTINUED | OUTPATIENT
Start: 2018-11-27 | End: 2018-11-28 | Stop reason: HOSPADM

## 2018-11-27 RX ORDER — FLECAINIDE ACETATE 50 MG/1
100 TABLET ORAL DAILY
Status: DISCONTINUED | OUTPATIENT
Start: 2018-11-27 | End: 2018-11-28 | Stop reason: HOSPADM

## 2018-11-27 RX ORDER — TAMSULOSIN HYDROCHLORIDE 0.4 MG/1
0.4 CAPSULE ORAL DAILY
Status: DISCONTINUED | OUTPATIENT
Start: 2018-11-27 | End: 2018-11-28 | Stop reason: HOSPADM

## 2018-11-27 RX ORDER — GLUCAGON 1 MG
1 KIT INJECTION
Status: DISCONTINUED | OUTPATIENT
Start: 2018-11-27 | End: 2018-11-28 | Stop reason: HOSPADM

## 2018-11-27 RX ORDER — ONDANSETRON 2 MG/ML
4 INJECTION INTRAMUSCULAR; INTRAVENOUS EVERY 6 HOURS PRN
Status: DISCONTINUED | OUTPATIENT
Start: 2018-11-27 | End: 2018-11-28 | Stop reason: HOSPADM

## 2018-11-27 RX ADMIN — MORPHINE SULFATE 2 MG: 4 INJECTION, SOLUTION INTRAMUSCULAR; INTRAVENOUS at 03:11

## 2018-11-27 RX ADMIN — ONDANSETRON 4 MG: 2 INJECTION INTRAMUSCULAR; INTRAVENOUS at 03:11

## 2018-11-27 RX ADMIN — DEXTROSE MONOHYDRATE AND SODIUM CHLORIDE: 5; .45 INJECTION, SOLUTION INTRAVENOUS at 04:11

## 2018-11-27 RX ADMIN — SODIUM CHLORIDE 5 ML: 9 INJECTION, SOLUTION INTRAMUSCULAR; INTRAVENOUS; SUBCUTANEOUS at 04:11

## 2018-11-27 RX ADMIN — MORPHINE SULFATE 2 MG: 4 INJECTION, SOLUTION INTRAMUSCULAR; INTRAVENOUS at 05:11

## 2018-11-27 RX ADMIN — TAMSULOSIN HYDROCHLORIDE 0.4 MG: 0.4 CAPSULE ORAL at 10:11

## 2018-11-27 RX ADMIN — DEXTROSE MONOHYDRATE AND SODIUM CHLORIDE: 5; .45 INJECTION, SOLUTION INTRAVENOUS at 03:11

## 2018-11-27 RX ADMIN — FLECAINIDE ACETATE 50 MG: 50 TABLET ORAL at 10:11

## 2018-11-27 RX ADMIN — ENOXAPARIN SODIUM 40 MG: 100 INJECTION SUBCUTANEOUS at 05:11

## 2018-11-27 RX ADMIN — MORPHINE SULFATE 2 MG: 4 INJECTION, SOLUTION INTRAMUSCULAR; INTRAVENOUS at 11:11

## 2018-11-27 RX ADMIN — MUPIROCIN 22 G: 20 OINTMENT TOPICAL at 12:11

## 2018-11-27 RX ADMIN — FAMOTIDINE 20 MG: 20 INJECTION, SOLUTION INTRAVENOUS at 07:11

## 2018-11-27 NOTE — ED NOTES
The pts oxygen was 90 on room air. 2 L was applied. Pt denies feeling short of breath or chest pain

## 2018-11-27 NOTE — CONSULTS
[facility] -   Neurosurgery  Consult Note    Inpatient consult to Neurosurgery  Consult performed by: Adonis Summers MD  Consult ordered by: Nena Chen Do, MD  Reason for consult: c1-2 rotational injury   Assessment/Recommendations: Discuss this case with Dr. Melany Brunson. Patient has a number of medical comorbidities with a complex fracture C2 and rotational injury of C1-2.  Will require surgical stabilization.  Dr. Jesus Alberto Joyce at [facility] has agreed to treat and evaluate the patient.  We will arrange for transfer to our [facility] for further care and management.        Subjective:     Chief Complaint/Reason for Admission:  C1-C2 instability    History of Present Illness:  85-year-old gentleman with multiple medical issues presents to an outside ER with complaints of severe neck pain following a fall from standing.  He presented to an outside ER CT scan showed a C1-2 rotational injury with a fracture of the lateral mass of C2 and he was sent to [facility] for evaluation.  In the ER today with complaints of only neck pain not going into the upper extremities.  He denies any numbness and tingling to the hands.  He denies any weakness of the arms and legs.  He was able to ambulate following the injury.  He does note that his neck is fixed to the right and he had difficulty turning his head secondary to pain.  No bowel or bladder symptoms    PTA Medications   Medication Sig    apixaban (ELIQUIS) 2.5 mg Tab Take 2.5 mg by mouth 2 (two) times daily.    flecainide (TAMBOCOR) 50 MG Tab Take 50 mg by mouth once daily.     gabapentin (NEURONTIN) 100 MG capsule Take 400 mg by mouth 2 (two) times daily.     mesalamine (DELZICOL) 400 mg CpDR EC capsule Take 2,400 mg by mouth once daily.     midodrine (PROAMATINE) 10 MG tablet Take 10 mg by mouth 3 (three) times daily.    tamsulosin (FLOMAX) 0.4 mg Cp24 Take 0.4 mg by mouth once daily.    albuterol 90 mcg/actuation inhaler  Inhale 2 puffs into the lungs every 6 (six) hours.    pantoprazole (PROTONIX) 40 MG tablet     PHENobarbital (LUMINAL) 16.2 MG tablet Take 16.2 mg by mouth 2 (two) times daily.       Review of patient's allergies indicates:   Allergen Reactions    Codeine     Hydrocodone     Oxycodone-acetaminophen     Propoxyphene n-acetaminophen        Past Medical History:   Diagnosis Date    Anemia     Anticoagulant long-term use     BPH (benign prostatic hyperplasia)     Cancer     Non-hodgkins lymphoma    Colitis     DVT (deep venous thrombosis)     GERD (gastroesophageal reflux disease)     Hypertension     NHL (non-Hodgkin's lymphoma)     Orthostatic hypotension dysautonomic syndrome     PE (pulmonary thromboembolism)     Peripheral neuropathy     Pulmonary fibrosis     Shingles     Sick sinus syndrome     UC (ulcerative colitis)      Past Surgical History:   Procedure Laterality Date    APPENDECTOMY      CARDIAC PACEMAKER PLACEMENT      CARDIAC PACEMAKER PLACEMENT      CATARACT EXTRACTION      COLONOSCOPY      STEFANO FILTER PLACEMENT  Jan . 2015    INGUINAL HERNIA REPAIR Right     KNEE SURGERY Right     MEDIPORT INSERTION, DOUBLE      TONSILLECTOMY       Family History     None        Tobacco Use    Smoking status: Never Smoker    Smokeless tobacco: Never Used   Substance and Sexual Activity    Alcohol use: No    Drug use: No    Sexual activity: No     Review of Systems   Constitutional: Negative for activity change, appetite change and chills.   HENT: Negative for congestion and ear pain.    Eyes: Negative for photophobia, redness and visual disturbance.   Respiratory: Negative for apnea and chest tightness.    Cardiovascular: Negative for chest pain.   Gastrointestinal: Negative for abdominal distention and abdominal pain.   Endocrine: Negative for cold intolerance.   Genitourinary: Negative for difficulty urinating.   Musculoskeletal: Positive for myalgias, neck pain and neck  stiffness. Negative for arthralgias.   Skin: Negative for color change.   Allergic/Immunologic: Negative for environmental allergies.   Neurological: Positive for weakness and numbness. Negative for dizziness.   Hematological: Negative for adenopathy. Does not bruise/bleed easily.   Psychiatric/Behavioral: Negative for agitation, behavioral problems and confusion.     Objective:     Weight: 81.6 kg (180 lb)  Body mass index is 25.83 kg/m².  Vital Signs (Most Recent):  Temp: 97.6 °F (36.4 °C) (11/27/18 0851)  Pulse: 65 (11/27/18 1051)  Resp: 20 (11/27/18 0848)  BP: (!) 178/95 (11/27/18 1051)  SpO2: 97 % (11/27/18 0848) Vital Signs (24h Range):  Temp:  [97.5 °F (36.4 °C)-98.9 °F (37.2 °C)] 97.6 °F (36.4 °C)  Pulse:  [] 65  Resp:  [16-21] 20  SpO2:  [94 %-99 %] 97 %  BP: (145-182)/() 178/95                           Urethral Catheter 11/26/18 2309 (Active)       Physical Exam:  Nursing note and vitals reviewed.    Constitutional: He appears well-developed and well-nourished. He is not diaphoretic. No distress.     Eyes: Pupils are equal, round, and reactive to light. EOM are normal.     Cardiovascular: Normal pulses, intact distal pulses, normal distal pulses, intact carotid pulses, normal carotid pulses and no edema.     Psych/Behavior: He is alert. He is oriented to person, place, and time. He has a normal mood and affect.     Musculoskeletal: Gait is abnormal.        Neck: Range of motion is limited. ROM severity is c collar in place . There is tenderness. Muscle strength is 5/5. Tone is normal.        Back: Range of motion is full. There is no tenderness. Muscle strength is 5/5. Tone is normal.        Right Upper Extremities: Range of motion is full. There is no tenderness. Muscle strength is 5/5. Tone is normal.        Left Upper Extremities: Range of motion is full. There is no tenderness. Muscle strength is 5/5. Tone is normal.       Right Lower Extremities: Range of motion is full. There is no  tenderness. Muscle strength is 5/5. Tone is normal.        Left Lower Extremities: Range of motion is full. There is no tenderness. Muscle strength is 5/5. Tone is normal.     Neurological:        Coordination: He has a normal Romberg Test, normal finger to nose coordination, normal heel to shin coordination and normal tandem walking coordination.        Sensory: There is no sensory deficit in the trunk. There is no sensory deficit in the extremities.        DTRs: DTRs are DTRS NORMAL AND SYMMETRICnormal and symmetric. Tricep reflexes are 2+ on the right side and 2+ on the left side. Bicep reflexes are 2+ on the right side and 2+ on the left side. Brachioradialis reflexes are 2+ on the right side and 2+ on the left side. Patellar reflexes are 2+ on the right side and 2+ on the left side. Achilles reflexes are 2+ on the right side and 2+ on the left side. He displays no Babinski's sign on the right side. He displays no Babinski's sign on the left side.        Cranial nerves: Cranial nerve(s) II, III, IV, V, VI, VII, VIII, IX, X, XI and XII are intact.       Significant Labs:  Recent Labs   Lab 11/26/18  2305 11/27/18  0340    86    138   K 4.6 4.9    107   CO2 18* 20*   BUN 24* 23   CREATININE 1.1 1.1   CALCIUM 9.1 9.1   MG  --  2.2     Recent Labs   Lab 11/26/18  2305 11/27/18  0340   WBC 9.01 9.29   HGB 14.2 14.3   HCT 42.0 41.7    199     Recent Labs   Lab 11/27/18  0340   INR 1.0   APTT 29.6     Microbiology Results (last 7 days)     ** No results found for the last 168 hours. **        All pertinent labs from the last 24 hours have been reviewed.  Significant Diagnostics:  I have reviewed all pertinent imaging results/findings within the past 24 hours.    Assessment/Plan:     Active Diagnoses:    Diagnosis Date Noted POA    PRINCIPAL PROBLEM:  Closed nondisplaced fracture of second cervical vertebra [S12.101A] 11/27/2018 Yes    Orthostatic hypotension [I95.1] 11/27/2018 Yes    Sick  sinus syndrome [I49.5] 04/12/2016 Yes    Neuropathy [G62.9] 01/06/2016 Yes    Inflammatory bowel disease [K52.9] 02/05/2015 Yes    Pulmonary emboli [I26.99] 01/01/2015 Yes      Problems Resolved During this Admission:       Thank you for your consult. I will sign off. Please contact us if you have any additional questions.    Adonis Summers MD  Neurosurgery  [facility] -

## 2018-11-27 NOTE — PLAN OF CARE
CM met with patient at the bedside to assess for discharge needs.  Patient lives at home with his wife.  He is normally independent with the use of a cane during the day and a walker at night.  Patient states that he was told that he may possible transfer to [facility].  Patient unsure of needs at this time.  PCP is DR Rivera and preferred pharmacy is [facility].  CM provided a transitional care folder, information on advanced directives, information on pharmacy bedside delivery, and discharge planning begins on admission with contact information for any needs/questions.    [facility] [id number] [place] [address] [address] [place], [place] [address] AT [facility] [address] [place] [address] [address] [place] [address]  Phone: [phone] Fax: [phone]    [id number] - [place], [place] - [address]  [address] [address]  Phone: [phone] Fax: [phone]    Narinder Rivera MD  Payor: [employer] MEDICARE / Plan: [employer] MEDICARE HMO / Product Type: Capitation /       11/27/18 1602   Discharge Assessment   Assessment Type Discharge Planning Assessment   Confirmed/corrected address and phone number on facesheet? Yes   Assessment information obtained from? Patient;Medical Record   Expected Length of Stay (days) (TBD)   Communicated expected length of stay with patient/caregiver yes   Prior to hospitilization cognitive status: Alert/Oriented   Prior to hospitalization functional status: Independent;Assistive Equipment   Current cognitive status: Alert/Oriented   Current Functional Status: Needs Assistance   Facility Arrived From: home   Lives With spouse   Able to Return to Prior Arrangements yes   Is patient able to care for self after discharge? Yes   Who are your caregiver(s) and their phone number(s)? Brenda Ortega, spouse [phone]   Patient's perception of discharge disposition home or selfcare   Readmission Within The Last 30 Days no  previous admission in last 30 days   Patient currently being followed by outpatient case management? No   Patient currently receives any other outside agency services? No   Equipment Currently Used at Home cane, straight;walker, rolling   Do you have any problems affording any of your prescribed medications? No   Is the patient taking medications as prescribed? yes   Does the patient have transportation home? Yes   Transportation Available family or friend will provide   Dialysis Name and Scheduled days NA   Does the patient receive services at the [facility]? No   Discharge Plan A Other  (Possible transfer)   Discharge Plan B Home with family   Patient/Family In Agreement With Plan yes

## 2018-11-27 NOTE — HOSPITAL COURSE
Pt admitted to Telemetry Unit for Closed nondisplaced fracture of second cervical vertebra s/p fall.  Neurosurgery consulted for surgical stabilization.  CT of cervical spine showed interval development of atlanto-axial subluxation with interval development of an acute appearing fracture on the left side of the C2 involving the superior articular surface. The fracture also extends through the anterior and posterior walls of the left vertebral canal. Grade 1 retrolisthesis of C3 on C4 also present.  C-collar in place with analgesia prn continued.  Hydralazine prn for elevated BP.  Transfer to Dr. KIRAN Joyce at [facility] initiated due to surgical complexity and need for higher level of care.  No beds available for patient transfer at this time.  Lisinopril initiated due to continued elevation of BP with control obtained.  Bed available at [facility] with ambulance called for transfer.  Pt afebrile with vital signs stable and no signs of acute distress prior to discharge.  Eliquis has been held in anticipation of pending procedure.  Pt seen and examined on the date of discharge and deemed suitable for discharge to [facility] for further evaluation for surgical intervention.  Pt instructed to follow up with Dr. KIRAN Joyce (Orthopedic Surgery) upon arrival to [facility].

## 2018-11-27 NOTE — ED PROVIDER NOTES
SCRIBE #1 NOTE: I, Ariadne Welsh, am scribing for, and in the presence of, Nena Chen Do, MD. I have scribed the entire note.         History     Chief Complaint   Patient presents with    Neck Injury     pt transfered from [facility] with c/o cervical fracture       Review of patient's allergies indicates:   Allergen Reactions    Codeine     Hydrocodone     Oxycodone-acetaminophen     Propoxyphene n-acetaminophen          History of Present Illness   HPI    11/26/2018, 9:17 PM  History obtained from the patient      History of Present Illness: Viral Ortega Jr. is a 85 y.o. male patient who presents to the Emergency Department for neck injury which onset gradually today. Patient was evaluated at [facility] after a slip and fall that occurred yesterday. Patient states he was outdoors working on his generator and the concrete was wet and he slip and fell. Patient reports scraping his R arm on the wall and then landed on concrete. Patient denies any head injury and LOC. Patient had CT head and neck that showed C2 fracture. Patient transferred from [facility] for neurosurgery services. Symptoms are constant and moderate in severity. No mitigating or exacerbating factors reported. Associated sxs include neck pain. Patient denies any HA, dizziness, CP, abd pain, back pain, hip pain, N/V, extremity weakness/numbness/tingling, and all other sxs at this time. Patient reports cyanosis to bilat feet at night that improve with elevation. No further complaints or concerns at this time.     Arrival mode:  AASI    PCP: Narinder Rivera MD        Past Medical History:  Past Medical History:   Diagnosis Date    Anemia     Anticoagulant long-term use     BPH (benign prostatic hyperplasia)     Cancer     Non-hodgkins lymphoma    Colitis     DVT (deep venous thrombosis)     GERD (gastroesophageal reflux disease)     Hypertension     Orthostatic hypotension dysautonomic syndrome     Peripheral neuropathy      Pulmonary fibrosis     Shingles     Sick sinus syndrome     UC (ulcerative colitis)        Past Surgical History:  Past Surgical History:   Procedure Laterality Date    APPENDECTOMY      CARDIAC PACEMAKER PLACEMENT      CARDIAC PACEMAKER PLACEMENT      CATARACT EXTRACTION      COLONOSCOPY      STEFANO FILTER PLACEMENT  Jan . 2015    INGUINAL HERNIA REPAIR Right     KNEE SURGERY Right     MEDIPORT INSERTION, DOUBLE      TONSILLECTOMY           Family History:  History reviewed. No pertinent family history.    Social History:  Social History     Tobacco Use    Smoking status: Never Smoker    Smokeless tobacco: Never Used   Substance and Sexual Activity    Alcohol use: No    Drug use: No    Sexual activity: No        Review of Systems   Review of Systems   Constitutional: Negative for fever.   HENT: Negative for sore throat.    Respiratory: Negative for shortness of breath.    Cardiovascular: Negative for chest pain.   Gastrointestinal: Negative for abdominal pain and nausea.   Genitourinary: Negative for dysuria.   Musculoskeletal: Positive for neck pain. Negative for back pain.        (-) hip pain   Skin: Negative for rash.   Neurological: Negative for dizziness, weakness, numbness and headaches.        (-) extremity tingling   Hematological: Does not bruise/bleed easily.   All other systems reviewed and are negative.       Physical Exam     Initial Vitals [11/26/18 2059]   BP Pulse Resp Temp SpO2   (!) 168/113 106 18 97.5 °F (36.4 °C) 95 %      MAP       --          Physical Exam  Nursing Notes and Vital Signs Reviewed.  Constitutional: Patient is in no acute distress. Well-developed and well-nourished.  Head: Atraumatic. Normocephalic.  Eyes: PERRL. EOM intact. Conjunctivae are not pale. No scleral icterus.  ENT: Mucous membranes are moist. Oropharynx is clear and symmetric.    Neck: Soft collar in place.  Cardiovascular: Regular rate. Regular rhythm. No murmurs, rubs, or gallops. Distal pulses  "are 2+ and symmetric. Good femoral pulse.  Pulmonary/Chest: No respiratory distress. Clear to auscultation bilaterally. No wheezing or rales.  Abdominal: Soft and non-distended.  There is no tenderness.  No rebound, guarding, or rigidity. Good bowel sounds.  Genitourinary: No CVA tenderness  Musculoskeletal: Moves all extremities. No obvious deformities. No edema. No calf tenderness.  Skin: Warm and dry. Cyanosis to plantar aspect of bilat feet that improve with elevation. Abrasion to R elbow.  Neurological:  Alert, awake, and appropriate.  Normal speech.  No acute focal neurological deficits are appreciated.  Psychiatric: Normal affect. Good eye contact. Appropriate in content.     ED Course   Procedures  ED Vital Signs:  Vitals:    11/26/18 2059 11/26/18 2240 11/26/18 2300 11/26/18 2305   BP: (!) 168/113 (!) 162/81     Pulse: 106 65 63 74   Resp: 18 18  (!) 21   Temp: 97.5 °F (36.4 °C)      TempSrc: Oral      SpO2: 95% 98%  96%   Weight: 81.6 kg (180 lb)      Height: 5' 10" (1.778 m)       11/26/18 2340 11/27/18 0000 11/27/18 0112   BP:  (!) 155/95    Pulse: 66 66 79   Resp: (!) 21 19 18   Temp:  98 °F (36.7 °C)    TempSrc:  Oral    SpO2: 96% 95% 98%   Weight:      Height:          Abnormal Lab Results:  Labs Reviewed   CBC W/ AUTO DIFFERENTIAL - Abnormal; Notable for the following components:       Result Value    RBC 4.42 (*)     MCH 32.1 (*)     RDW 14.9 (*)     All other components within normal limits   COMPREHENSIVE METABOLIC PANEL - Abnormal; Notable for the following components:    CO2 18 (*)     BUN, Bld 24 (*)     Albumin 3.4 (*)     Total Bilirubin 1.1 (*)     All other components within normal limits   TROPONIN I - Abnormal; Notable for the following components:    Troponin I 0.031 (*)     All other components within normal limits        All Lab Results:  Results for orders placed or performed during the hospital encounter of 11/26/18   CBC auto differential   Result Value Ref Range    WBC 9.01 3.90 - " 12.70 K/uL    RBC 4.42 (L) 4.60 - 6.20 M/uL    Hemoglobin 14.2 14.0 - 18.0 g/dL    Hematocrit 42.0 40.0 - 54.0 %    MCV 95 82 - 98 fL    MCH 32.1 (H) 27.0 - 31.0 pg    MCHC 33.8 32.0 - 36.0 g/dL    RDW 14.9 (H) 11.5 - 14.5 %    Platelets 200 150 - 350 K/uL    MPV 10.7 9.2 - 12.9 fL    Gran # (ANC) 6.5 1.8 - 7.7 K/uL    Lymph # 1.7 1.0 - 4.8 K/uL    Mono # 0.8 0.3 - 1.0 K/uL    Eos # 0.0 0.0 - 0.5 K/uL    Baso # 0.02 0.00 - 0.20 K/uL    Gran% 72.2 38.0 - 73.0 %    Lymph% 18.6 18.0 - 48.0 %    Mono% 8.9 4.0 - 15.0 %    Eosinophil% 0.1 0.0 - 8.0 %    Basophil% 0.2 0.0 - 1.9 %    Differential Method Automated    Comprehensive metabolic panel   Result Value Ref Range    Sodium 138 136 - 145 mmol/L    Potassium 4.6 3.5 - 5.1 mmol/L    Chloride 107 95 - 110 mmol/L    CO2 18 (L) 23 - 29 mmol/L    Glucose 101 70 - 110 mg/dL    BUN, Bld 24 (H) 8 - 23 mg/dL    Creatinine 1.1 0.5 - 1.4 mg/dL    Calcium 9.1 8.7 - 10.5 mg/dL    Total Protein 7.1 6.0 - 8.4 g/dL    Albumin 3.4 (L) 3.5 - 5.2 g/dL    Total Bilirubin 1.1 (H) 0.1 - 1.0 mg/dL    Alkaline Phosphatase 115 55 - 135 U/L    AST 33 10 - 40 U/L    ALT 22 10 - 44 U/L    Anion Gap 13 8 - 16 mmol/L    eGFR if African American >60 >60 mL/min/1.73 m^2    eGFR if non African American >60 >60 mL/min/1.73 m^2   Troponin I #1   Result Value Ref Range    Troponin I 0.031 (H) 0.000 - 0.026 ng/mL       Imaging Results:  Imaging Results          X-Ray Chest AP Portable (Final result)  Result time 11/27/18 00:02:44    Final result by Barrie Castillo III, MD (11/27/18 00:02:44)                 Impression:      Stable chest x-ray.  No consolidation or effusion.      Electronically signed by: Barrie Castillo MD  Date:    11/27/2018  Time:    00:02             Narrative:    EXAMINATION:  XR CHEST AP PORTABLE    CLINICAL HISTORY:  chest pain;    COMPARISON:  September    FINDINGS:  Normal heart size with mild tortuosity of the thoracic aorta.  Prominent bipolar pacing device noted in  position on the left.  Peripheral lungs show no detrimental change.  No consolidation or effusion.                               Ordered, reviewed, and independently interpreted by the ED provider.  Study: X-ray Chest  Findings: Defibrillator in place. 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 Acute fracture involving the left lateral mass and C2 with extension to the left foramen transversarium.     The EKG was ordered, reviewed, and independently interpreted by the ED provider.  Interpretation time: 2236  Rate: 67 BPM  Rhythm: Electronic atrial pacemaker  Interpretation: Low voltage QRS. RSR' or QR pattern in V1 suggests right ventricular conduction delay. Cannot rule out anterior infarct, age undetermined. No STEMI.          The Emergency Provider reviewed the vital signs and test results, which are outlined above.     ED Discussion     10:00 PM: Dr. Shell discussed the pt's case with Dr. Summers (Neurosurgery) who recommends MRI C-spine and CTA neck.  (MRI not ordered bc pt has pacemaker)    1:30 AM: Discussed case with Dr. Badillo ([facility]) who agrees with current care and management of pt and accepts admission. Dr. Badillo recommends NPO.  Admitting Service: Hospital medicine   Admitting Physician: Dr. Badillo  Admit to: Inpatient-tele    1:31 AM: Re-evaluated pt. I have discussed test results, shared treatment plan, and the need for admission with patient. Pt expresses understanding at this time and agree with all information. All questions answered. Pt has no further questions or concerns at this time. Pt is ready for admit.      ED Medication(s):  Medications   morphine injection 4 mg (4 mg Intravenous Given 11/26/18 2159)   ondansetron injection 4 mg (4 mg Intravenous Given 11/26/18 2159)   mupirocin 2 % ointment 22 g (22 g Topical (Top) Given 11/27/18 0004)                  Medical Decision Making      Medical Decision Making:   Clinical Tests:   Lab Tests: Ordered and Reviewed  Radiological Study: Ordered and Reviewed  Medical Tests: Ordered and Reviewed             Scribe Attestation:   Scribe #1: I performed the above scribed service and the documentation accurately describes the services I performed. I attest to the accuracy of the note.     Attending:   Physician Attestation Statement for Scribe #1: I, Nena Chen Do, MD, personally performed the services described in this documentation, as scribed by Ariadne Welsh, in my presence, and it is both accurate and complete.           Clinical Impression       ICD-10-CM ICD-9-CM   1. Closed nondisplaced fracture of second cervical vertebra, unspecified fracture morphology, initial encounter S12.101A 805.02   2. Chest pain R07.9 786.50       Disposition:   Disposition: Admitted (Inpatient-tele)  Condition: Serious         Nena Chen Do, MD  11/27/18 9609

## 2018-11-27 NOTE — ED NOTES
Dr. Summers on unit to evaluate pt.  CT image disc given to Dr. Summers, states he will upload the disc into the pt's medical record.

## 2018-11-27 NOTE — PROGRESS NOTES
Pt with pain to top of head and lt eye upon sitting up or elevating in bed. Will notify md. Notified sarahi del castillo of pt admit instructed on pt bp earlier, bp still high. Will given morphine for pain. Pt denies blurred vision

## 2018-11-27 NOTE — HPI
85M h/o orthostatic hypotension, NHL, BPH, sick sinus syndrome s/p PPM, and UC transferred from [facility] for neck injury.  He reports he slipped on wet concrete, tried to break his fall with his R arm on the wall but fell back on concrete.  He denies head injury or LOC.  Patient had CT head and neck at [facility] that showed C2 fracture and transferred to [facility] for neurosurgery services. Symptoms are constant and moderate in severity. No mitigating or exacerbating factors reported. Associated sxs include neck pain. Patient denies any HA, dizziness, CP, abd pain, back pain, hip pain, N/V, extremity weakness/numbness/tingling, and all other sxs at this time. Patient reports cyanosis to bilat feet at night that improve with elevation. No further complaints or concerns at this time.   [place] [place] C spine and CTA neck.  Hospital medicine called for admission.

## 2018-11-27 NOTE — ASSESSMENT & PLAN NOTE
- patient currently hypertensive while lying down  - currently on morphine prn pain  - will hold midodrine for now

## 2018-11-27 NOTE — ED NOTES
The pts wound was cleaned, edges realigned and steri strips applied along with anti bacterial ointment. The wound was dressed with a mepilex

## 2018-11-27 NOTE — H&P
[facility] Hospital Medicine  History & Physical    Patient Name: Viral Ortega Jr.  MRN: [id number]  Admission Date: 11/26/2018  Attending Physician: Daniel Badillo MD  Primary Care Provider: Narinder Rivera MD         Patient information was obtained from patient and ER records.     Subjective:     Principal Problem:Closed nondisplaced fracture of second cervical vertebra    Chief Complaint:   Chief Complaint   Patient presents with    Neck Injury     pt transfered from [facility] with c/o cervical fracture        HPI: 85M h/o orthostatic hypotension, NHL, BPH, sick sinus syndrome s/p PPM, and UC transferred from [facility] for neck injury.  He reports he slipped on wet concrete, tried to break his fall with his R arm on the wall but fell back on concrete.  He denies head injury or LOC.  Patient had CT head and neck at [facility] that showed C2 fracture  and transferred to [facility] for neurosurgery services. Symptoms are constant and moderate in severity. No mitigating or exacerbating factors reported. Associated sxs include neck pain. Patient denies any HA, dizziness, CP, abd pain, back pain, hip pain, N/V, extremity weakness/numbness/tingling, and all other sxs at this time. Patient reports cyanosis to bilat feet at night that improve with elevation. No further complaints or concerns at this time.   [place] [place] and [place] C spine and CTA neck.  Hospital medicine called for admission.         Past Medical History:   Diagnosis Date    Anemia     Anticoagulant long-term use     BPH (benign prostatic hyperplasia)     Cancer     Non-hodgkins lymphoma    Colitis     DVT (deep venous thrombosis)     GERD (gastroesophageal reflux disease)     Hypertension     NHL (non-Hodgkin's lymphoma)     Orthostatic hypotension dysautonomic syndrome     PE (pulmonary thromboembolism)     Peripheral neuropathy     Pulmonary fibrosis     Shingles     Sick sinus syndrome      UC (ulcerative colitis)        Past Surgical History:   Procedure Laterality Date    APPENDECTOMY      CARDIAC PACEMAKER PLACEMENT      CARDIAC PACEMAKER PLACEMENT      CATARACT EXTRACTION      COLONOSCOPY      STEFANO FILTER PLACEMENT  Jan . 2015    INGUINAL HERNIA REPAIR Right     KNEE SURGERY Right     MEDIPORT INSERTION, DOUBLE      TONSILLECTOMY         Review of patient's allergies indicates:   Allergen Reactions    Codeine     Hydrocodone     Oxycodone-acetaminophen     Propoxyphene n-acetaminophen        No current facility-administered medications on file prior to encounter.      Current Outpatient Medications on File Prior to Encounter   Medication Sig    albuterol 90 mcg/actuation inhaler Inhale 2 puffs into the lungs every 6 (six) hours.    apixaban (ELIQUIS) 2.5 mg Tab Take 2.5 mg by mouth 2 (two) times daily.    flecainide (TAMBOCOR) 50 MG Tab Take 100 mg by mouth once daily.     gabapentin (NEURONTIN) 100 MG capsule Take 400 mg by mouth 2 (two) times daily.     mesalamine (DELZICOL) 400 mg CpDR EC capsule Take 2,400 mg by mouth once daily.     methylPREDNISolone (MEDROL DOSEPACK) 4 mg tablet Take as directed.    midodrine (PROAMATINE) 10 MG tablet Take 10 mg by mouth 3 (three) times daily.    pantoprazole (PROTONIX) 40 MG tablet     PHENobarbital (LUMINAL) 16.2 MG tablet Take 16.2 mg by mouth 2 (two) times daily.    tamsulosin (FLOMAX) 0.4 mg Cp24 Take 0.4 mg by mouth once daily.     Family History     Reviewed and not Pertinent           Tobacco Use    Smoking status: Never Smoker    Smokeless tobacco: Never Used   Substance and Sexual Activity    Alcohol use: No    Drug use: No    Sexual activity: No     Review of Systems   Constitutional: Negative for activity change, appetite change, chills, diaphoresis, fatigue and fever.        S/p fall   HENT: Negative for facial swelling, sore throat, tinnitus and trouble swallowing.         Neck pain   Eyes: Negative for  photophobia and visual disturbance.   Respiratory: Negative for apnea, cough, chest tightness, shortness of breath and wheezing.    Cardiovascular: Negative for chest pain, palpitations and leg swelling.   Gastrointestinal: Negative for abdominal distention, abdominal pain, constipation, diarrhea, nausea and vomiting.   Endocrine: Negative for polydipsia, polyphagia and polyuria.   Genitourinary: Negative for decreased urine volume, dysuria, flank pain, frequency and hematuria.   Musculoskeletal: Negative for arthralgias, back pain, joint swelling, myalgias and neck stiffness.   Skin: Negative for pallor and rash.   Allergic/Immunologic: Negative for immunocompromised state.   Neurological: Negative for dizziness, seizures, syncope, weakness, numbness and headaches.   Psychiatric/Behavioral: Negative for confusion, hallucinations and suicidal ideas. The patient is not nervous/anxious.    All other systems reviewed and are negative.    Objective:     Vital Signs (Most Recent):  Temp: 98.9 °F (37.2 °C) (11/27/18 0330)  Pulse: 61 (11/27/18 0330)  Resp: 18 (11/27/18 0330)  BP: (!) 162/96 (11/27/18 0330)  SpO2: 99 % (11/27/18 0330) Vital Signs (24h Range):  Temp:  [97.5 °F (36.4 °C)-98.9 °F (37.2 °C)] 98.9 °F (37.2 °C)  Pulse:  [] 61  Resp:  [17-21] 18  SpO2:  [94 %-99 %] 99 %  BP: (155-168)/() 162/96     Weight: 81.6 kg (180 lb)  Body mass index is 25.83 kg/m².    Physical Exam   Constitutional: He is oriented to person, place, and time. He appears well-developed and well-nourished. No distress.   HENT:   Head: Normocephalic.   Mouth/Throat: Oropharynx is clear and moist.   Eyes: Conjunctivae and EOM are normal. Pupils are equal, round, and reactive to light. No scleral icterus.   Neck: No JVD present. No thyromegaly present.   Neck in aspen collar   Cardiovascular: Normal rate. Exam reveals no gallop and no friction rub.   No murmur heard.  Paced rhythm   Pulmonary/Chest: Effort normal and breath sounds  normal. No respiratory distress. He has no wheezes. He has no rales.   Abdominal: Soft. Bowel sounds are normal. He exhibits no distension. There is no tenderness. There is no guarding.   Musculoskeletal: Normal range of motion.   Neurological: He is alert and oriented to person, place, and time. No cranial nerve deficit.   Skin: Skin is warm. Capillary refill takes less than 2 seconds. He is not diaphoretic. No erythema.   Psychiatric: He has a normal mood and affect.   Nursing note and vitals reviewed.        CRANIAL NERVES     CN III, IV, VI   Pupils are equal, round, and reactive to light.  Extraocular motions are normal.        Significant Labs:   CBC:   Recent Labs   Lab 11/26/18 2305 11/27/18 0340   WBC 9.01 9.29   HGB 14.2 14.3   HCT 42.0 41.7    199     CMP:   Recent Labs   Lab 11/26/18 2305 11/27/18 0340    138   K 4.6 4.9    107   CO2 18* 20*    86   BUN 24* 23   CREATININE 1.1 1.1   CALCIUM 9.1 9.1   PROT 7.1  --    ALBUMIN 3.4* 3.3*   BILITOT 1.1*  --    ALKPHOS 115  --    AST 33  --    ALT 22  --    ANIONGAP 13 11   EGFRNONAA >60 >60     Coagulation:   Recent Labs   Lab 11/27/18 0340   INR 1.0   APTT 29.6     Urine Studies: No results for input(s): COLORU, APPEARANCEUA, PHUR, SPECGRAV, PROTEINUA, GLUCUA, KETONESU, BILIRUBINUA, OCCULTUA, NITRITE, UROBILINOGEN, LEUKOCYTESUR, RBCUA, WBCUA, BACTERIA, SQUAMEPITHEL, HYALINECASTS in the last 48 hours.    Invalid input(s): WRIGHTSUR  All pertinent labs within the past 24 hours have been reviewed.    Significant Imaging: I have reviewed all pertinent imaging results/findings within the past 24 hours.   Imaging Results          X-Ray Chest AP Portable (Final result)  Result time 11/27/18 00:02:44    Final result by Barrie Castillo III, MD (11/27/18 00:02:44)                 Impression:      Stable chest x-ray.  No consolidation or effusion.      Electronically signed by: Barrie Castillo MD  Date:    11/27/2018  Time:    00:02              Narrative:    EXAMINATION:  XR CHEST AP PORTABLE    CLINICAL HISTORY:  chest pain;    COMPARISON:  September    FINDINGS:  Normal heart size with mild tortuosity of the thoracic aorta.  Prominent bipolar pacing device noted in position on the left.  Peripheral lungs show no detrimental change.  No consolidation or effusion.                                  Assessment/Plan:     * Closed nondisplaced fracture of second cervical vertebra    - continue neuro checks q4h  - morphine IV prn pain  - keep NPO for possible surgery  - NSGY recommends MRI C spine and CTA neck  - patient unable to get MRI due to PPM,  - Order CT C spine and CTA neck stat       Orthostatic hypotension    - patient currently hypertensive while lying down  - currently on morphine prn pain  - will hold midodrine for now       Sick sinus syndrome    - s/p PPM  - continue flecainide for arrhythmia?       Neuropathy    - hold gabapentin       Inflammatory bowel disease    - hold mesalamine       Pulmonary emboli    - hold eliquis for possible surgery           VTE Risk Mitigation (From admission, onward)        Ordered     enoxaparin injection 40 mg  Daily      11/27/18 0318     Place CHIP hose  Until discontinued      11/27/18 0318     Place sequential compression device  Until discontinued      11/27/18 0318     IP VTE HIGH RISK PATIENT  Once      11/27/18 0318             Daniel Badillo MD  Department of Hospital Medicine   [facility]

## 2018-11-27 NOTE — ASSESSMENT & PLAN NOTE
- continue neuro checks q4h  - morphine IV prn pain  - keep NPO for possible surgery  - NSGY recommends MRI C spine and CTA neck  - patient unable to get MRI due to PPM,  - Order CT C spine and CTA neck stat

## 2018-11-27 NOTE — SUBJECTIVE & OBJECTIVE
Past Medical History:   Diagnosis Date    Anemia     Anticoagulant long-term use     BPH (benign prostatic hyperplasia)     Cancer     Non-hodgkins lymphoma    Colitis     DVT (deep venous thrombosis)     GERD (gastroesophageal reflux disease)     Hypertension     NHL (non-Hodgkin's lymphoma)     Orthostatic hypotension dysautonomic syndrome     PE (pulmonary thromboembolism)     Peripheral neuropathy     Pulmonary fibrosis     Shingles     Sick sinus syndrome     UC (ulcerative colitis)        Past Surgical History:   Procedure Laterality Date    APPENDECTOMY      CARDIAC PACEMAKER PLACEMENT      CARDIAC PACEMAKER PLACEMENT      CATARACT EXTRACTION      COLONOSCOPY      STEFANO FILTER PLACEMENT  Jan . 2015    INGUINAL HERNIA REPAIR Right     KNEE SURGERY Right     MEDIPORT INSERTION, DOUBLE      TONSILLECTOMY         Review of patient's allergies indicates:   Allergen Reactions    Codeine     Hydrocodone     Oxycodone-acetaminophen     Propoxyphene n-acetaminophen        No current facility-administered medications on file prior to encounter.      Current Outpatient Medications on File Prior to Encounter   Medication Sig    albuterol 90 mcg/actuation inhaler Inhale 2 puffs into the lungs every 6 (six) hours.    apixaban (ELIQUIS) 2.5 mg Tab Take 2.5 mg by mouth 2 (two) times daily.    flecainide (TAMBOCOR) 50 MG Tab Take 100 mg by mouth once daily.     gabapentin (NEURONTIN) 100 MG capsule Take 400 mg by mouth 2 (two) times daily.     mesalamine (DELZICOL) 400 mg CpDR EC capsule Take 2,400 mg by mouth once daily.     methylPREDNISolone (MEDROL DOSEPACK) 4 mg tablet Take as directed.    midodrine (PROAMATINE) 10 MG tablet Take 10 mg by mouth 3 (three) times daily.    pantoprazole (PROTONIX) 40 MG tablet     PHENobarbital (LUMINAL) 16.2 MG tablet Take 16.2 mg by mouth 2 (two) times daily.    tamsulosin (FLOMAX) 0.4 mg Cp24 Take 0.4 mg by mouth once daily.     Family History      None        Tobacco Use    Smoking status: Never Smoker    Smokeless tobacco: Never Used   Substance and Sexual Activity    Alcohol use: No    Drug use: No    Sexual activity: No     Review of Systems   Constitutional: Negative for activity change, appetite change, chills, diaphoresis, fatigue and fever.        S/p fall   HENT: Negative for facial swelling, sore throat, tinnitus and trouble swallowing.         Neck pain   Eyes: Negative for photophobia and visual disturbance.   Respiratory: Negative for apnea, cough, chest tightness, shortness of breath and wheezing.    Cardiovascular: Negative for chest pain, palpitations and leg swelling.   Gastrointestinal: Negative for abdominal distention, abdominal pain, constipation, diarrhea, nausea and vomiting.   Endocrine: Negative for polydipsia, polyphagia and polyuria.   Genitourinary: Negative for decreased urine volume, dysuria, flank pain, frequency and hematuria.   Musculoskeletal: Negative for arthralgias, back pain, joint swelling, myalgias and neck stiffness.   Skin: Negative for pallor and rash.   Allergic/Immunologic: Negative for immunocompromised state.   Neurological: Negative for dizziness, seizures, syncope, weakness, numbness and headaches.   Psychiatric/Behavioral: Negative for confusion, hallucinations and suicidal ideas. The patient is not nervous/anxious.    All other systems reviewed and are negative.    Objective:     Vital Signs (Most Recent):  Temp: 98.9 °F (37.2 °C) (11/27/18 0330)  Pulse: 61 (11/27/18 0330)  Resp: 18 (11/27/18 0330)  BP: (!) 162/96 (11/27/18 0330)  SpO2: 99 % (11/27/18 0330) Vital Signs (24h Range):  Temp:  [97.5 °F (36.4 °C)-98.9 °F (37.2 °C)] 98.9 °F (37.2 °C)  Pulse:  [] 61  Resp:  [17-21] 18  SpO2:  [94 %-99 %] 99 %  BP: (155-168)/() 162/96     Weight: 81.6 kg (180 lb)  Body mass index is 25.83 kg/m².    Physical Exam   Constitutional: He is oriented to person, place, and time. He appears well-developed  and well-nourished. No distress.   HENT:   Head: Normocephalic.   Mouth/Throat: Oropharynx is clear and moist.   Eyes: Conjunctivae and EOM are normal. Pupils are equal, round, and reactive to light. No scleral icterus.   Neck: No JVD present. No thyromegaly present.   Neck in aspen collar   Cardiovascular: Normal rate. Exam reveals no gallop and no friction rub.   No murmur heard.  Paced rhythm   Pulmonary/Chest: Effort normal and breath sounds normal. No respiratory distress. He has no wheezes. He has no rales.   Abdominal: Soft. Bowel sounds are normal. He exhibits no distension. There is no tenderness. There is no guarding.   Musculoskeletal: Normal range of motion.   Neurological: He is alert and oriented to person, place, and time. No cranial nerve deficit.   Skin: Skin is warm. Capillary refill takes less than 2 seconds. He is not diaphoretic. No erythema.   Psychiatric: He has a normal mood and affect.   Nursing note and vitals reviewed.        CRANIAL NERVES     CN III, IV, VI   Pupils are equal, round, and reactive to light.  Extraocular motions are normal.        Significant Labs:   CBC:   Recent Labs   Lab 11/26/18 2305 11/27/18  0340   WBC 9.01 9.29   HGB 14.2 14.3   HCT 42.0 41.7    199     CMP:   Recent Labs   Lab 11/26/18 2305 11/27/18 0340    138   K 4.6 4.9    107   CO2 18* 20*    86   BUN 24* 23   CREATININE 1.1 1.1   CALCIUM 9.1 9.1   PROT 7.1  --    ALBUMIN 3.4* 3.3*   BILITOT 1.1*  --    ALKPHOS 115  --    AST 33  --    ALT 22  --    ANIONGAP 13 11   EGFRNONAA >60 >60     Coagulation:   Recent Labs   Lab 11/27/18 0340   INR 1.0   APTT 29.6     Urine Studies: No results for input(s): COLORU, APPEARANCEUA, PHUR, SPECGRAV, PROTEINUA, GLUCUA, KETONESU, BILIRUBINUA, OCCULTUA, NITRITE, UROBILINOGEN, LEUKOCYTESUR, RBCUA, WBCUA, BACTERIA, SQUAMEPITHEL, HYALINECASTS in the last 48 hours.    Invalid input(s): WRIGHTSUR  All pertinent labs within the past 24 hours have been  reviewed.    Significant Imaging: I have reviewed all pertinent imaging results/findings within the past 24 hours.   Imaging Results          X-Ray Chest AP Portable (Final result)  Result time 11/27/18 00:02:44    Final result by Barrie Castillo III, MD (11/27/18 00:02:44)                 Impression:      Stable chest x-ray.  No consolidation or effusion.      Electronically signed by: Barrie Castillo MD  Date:    11/27/2018  Time:    00:02             Narrative:    EXAMINATION:  XR CHEST AP PORTABLE    CLINICAL HISTORY:  chest pain;    COMPARISON:  September    FINDINGS:  Normal heart size with mild tortuosity of the thoracic aorta.  Prominent bipolar pacing device noted in position on the left.  Peripheral lungs show no detrimental change.  No consolidation or effusion.

## 2018-11-27 NOTE — ED NOTES
The pt is able to move all extremities with no difficulty.  The pt has a bilateral hand tremor that he reported is not new but has no diagnosis

## 2018-11-28 VITALS
BODY MASS INDEX: 25.85 KG/M2 | OXYGEN SATURATION: 97 % | RESPIRATION RATE: 18 BRPM | WEIGHT: 180.56 LBS | DIASTOLIC BLOOD PRESSURE: 71 MMHG | SYSTOLIC BLOOD PRESSURE: 131 MMHG | TEMPERATURE: 98 F | HEIGHT: 70 IN | HEART RATE: 70 BPM

## 2018-11-28 LAB
ALBUMIN SERPL BCP-MCNC: 3 G/DL
ANION GAP SERPL CALC-SCNC: 7 MMOL/L
BASOPHILS # BLD AUTO: 0.03 K/UL
BASOPHILS NFR BLD: 0.4 %
BUN SERPL-MCNC: 18 MG/DL
CALCIUM SERPL-MCNC: 8.8 MG/DL
CHLORIDE SERPL-SCNC: 105 MMOL/L
CO2 SERPL-SCNC: 23 MMOL/L
CREAT SERPL-MCNC: 1.1 MG/DL
DIFFERENTIAL METHOD: ABNORMAL
EOSINOPHIL # BLD AUTO: 0.1 K/UL
EOSINOPHIL NFR BLD: 0.7 %
ERYTHROCYTE [DISTWIDTH] IN BLOOD BY AUTOMATED COUNT: 15 %
EST. GFR  (AFRICAN AMERICAN): >60 ML/MIN/1.73 M^2
EST. GFR  (NON AFRICAN AMERICAN): >60 ML/MIN/1.73 M^2
GLUCOSE SERPL-MCNC: 85 MG/DL
HCT VFR BLD AUTO: 43.4 %
HGB BLD-MCNC: 14.5 G/DL
LYMPHOCYTES # BLD AUTO: 1.6 K/UL
LYMPHOCYTES NFR BLD: 18.5 %
MAGNESIUM SERPL-MCNC: 1.9 MG/DL
MCH RBC QN AUTO: 32.3 PG
MCHC RBC AUTO-ENTMCNC: 33.4 G/DL
MCV RBC AUTO: 97 FL
MONOCYTES # BLD AUTO: 1.1 K/UL
MONOCYTES NFR BLD: 13.2 %
NEUTROPHILS # BLD AUTO: 5.7 K/UL
NEUTROPHILS NFR BLD: 67.2 %
PHOSPHATE SERPL-MCNC: 3.1 MG/DL
PHOSPHATE SERPL-MCNC: 3.1 MG/DL
PLATELET # BLD AUTO: 203 K/UL
PMV BLD AUTO: 11 FL
POTASSIUM SERPL-SCNC: 5 MMOL/L
RBC # BLD AUTO: 4.49 M/UL
SODIUM SERPL-SCNC: 135 MMOL/L
WBC # BLD AUTO: 8.4 K/UL

## 2018-11-28 PROCEDURE — 25000003 PHARM REV CODE 250: Performed by: INTERNAL MEDICINE

## 2018-11-28 PROCEDURE — 85025 COMPLETE CBC W/AUTO DIFF WBC: CPT

## 2018-11-28 PROCEDURE — 36415 COLL VENOUS BLD VENIPUNCTURE: CPT

## 2018-11-28 PROCEDURE — 25500020 PHARM REV CODE 255: Performed by: INTERNAL MEDICINE

## 2018-11-28 PROCEDURE — 83735 ASSAY OF MAGNESIUM: CPT

## 2018-11-28 PROCEDURE — 25000003 PHARM REV CODE 250: Performed by: HOSPITALIST

## 2018-11-28 PROCEDURE — 63600175 PHARM REV CODE 636 W HCPCS: Performed by: HOSPITALIST

## 2018-11-28 PROCEDURE — 94761 N-INVAS EAR/PLS OXIMETRY MLT: CPT

## 2018-11-28 PROCEDURE — 80069 RENAL FUNCTION PANEL: CPT

## 2018-11-28 PROCEDURE — 25000003 PHARM REV CODE 250: Performed by: NURSE PRACTITIONER

## 2018-11-28 RX ORDER — LISINOPRIL 5 MG/1
5 TABLET ORAL DAILY
Status: DISCONTINUED | OUTPATIENT
Start: 2018-11-28 | End: 2018-11-28 | Stop reason: HOSPADM

## 2018-11-28 RX ORDER — FAMOTIDINE 20 MG/1
20 TABLET, FILM COATED ORAL DAILY
Status: DISCONTINUED | OUTPATIENT
Start: 2018-11-28 | End: 2018-11-28 | Stop reason: HOSPADM

## 2018-11-28 RX ORDER — LISINOPRIL 5 MG/1
5 TABLET ORAL DAILY
Qty: 30 TABLET | Refills: 0 | Status: SHIPPED | OUTPATIENT
Start: 2018-11-29 | End: 2019-09-05

## 2018-11-28 RX ADMIN — TAMSULOSIN HYDROCHLORIDE 0.4 MG: 0.4 CAPSULE ORAL at 09:11

## 2018-11-28 RX ADMIN — MORPHINE SULFATE 2 MG: 4 INJECTION, SOLUTION INTRAMUSCULAR; INTRAVENOUS at 07:11

## 2018-11-28 RX ADMIN — MORPHINE SULFATE 2 MG: 4 INJECTION, SOLUTION INTRAMUSCULAR; INTRAVENOUS at 05:11

## 2018-11-28 RX ADMIN — MORPHINE SULFATE 2 MG: 4 INJECTION, SOLUTION INTRAMUSCULAR; INTRAVENOUS at 12:11

## 2018-11-28 RX ADMIN — LISINOPRIL 5 MG: 5 TABLET ORAL at 09:11

## 2018-11-28 RX ADMIN — FLECAINIDE ACETATE 100 MG: 50 TABLET ORAL at 09:11

## 2018-11-28 RX ADMIN — IOHEXOL 100 ML: 350 INJECTION, SOLUTION INTRAVENOUS at 10:11

## 2018-11-28 RX ADMIN — FAMOTIDINE 20 MG: 20 TABLET ORAL at 09:11

## 2018-11-28 NOTE — PROGRESS NOTES
Pt admitted to floor. Alert and oriented. Cooperative with care. Neuro intact. Pin point pupils. Denies n/v to arms or legs. C/o numbness to lt face, states he has had since fall. Denies chest pain. Heart with rrr. Pulses palpable. scds in place. Manuel cath in place. Lungs wnl. Encouraged dbc/10 x q1h wa. Abdomen soft. Nondistended. Pt can not remember last bm. Iv to rt and lt arms. Wnl. Pt is forgetful but oriented. Pt with blanchable redness to rt buttocks. Skin tear to rt arm with mepilex. Bed alarm on . Aspen collar on intact. Call light In pt hand.

## 2018-11-28 NOTE — DISCHARGE SUMMARY
[facility] Hospital Medicine  Discharge Summary      Patient Name: Viral Ortega Jr.  MRN: [id number]  Admission Date: 11/26/2018  Hospital Length of Stay: 1 days  Discharge Date and Time:  11/28/2018 5:37 PM  Attending Physician: Alexander Perez MD   Discharging Provider: Radha Reza NP  Primary Care Provider: Narinder Rivera MD      HPI:   85M h/o orthostatic hypotension, NHL, BPH, sick sinus syndrome s/p PPM, and UC transferred from [facility] for neck injury.  He reports he slipped on wet concrete, tried to break his fall with his R arm on the wall but fell back on concrete.  He denies head injury or LOC.  Patient had CT head and neck at [facility] that showed C2 fracture  and transferred to [facility] for neurosurgery services. Symptoms are constant and moderate in severity. No mitigating or exacerbating factors reported. Associated sxs include neck pain. Patient denies any HA, dizziness, CP, abd pain, back pain, hip pain, N/V, extremity weakness/numbness/tingling, and all other sxs at this time. Patient reports cyanosis to bilat feet at night that improve with elevation. No further complaints or concerns at this time.   In ER, Dr. Summers ([facility]) called and recommends MRI C spine and CTA neck.  Hospital medicine called for admission.         * No surgery found *      Hospital Course:   Pt admitted to Telemetry Unit for Closed nondisplaced fracture of second cervical vertebra s/p fall.  Neurosurgery consulted for surgical stabilization.  CT of cervical spine showed interval development of atlanto-axial subluxation with interval development of an acute appearing fracture on the left side of the C2 involving the superior articular surface. The fracture also extends through the anterior and posterior walls of the left vertebral canal. Grade 1 retrolisthesis of C3 on C4 also present.  C-collar in place with analgesia prn continued.  Hydralazine prn for elevated BP.  Transfer to Dr. KIRAN Joyce at [facility]  requested due to surgical complexity and need for higher level of care.  No beds available for patient transfer at this time.  Lisinopril initiated due to continued elevation of BP with control obtained.  Bed available at [facility] with ambulance called for transfer.  Pt afebrile with vital signs stable and no signs of acute distress prior to discharge.  Eliquis has been held in anticipation of pending procedure.  Pt seen and examined on the date of discharge and deemed suitable for discharge to [facility] for further evaluation for surgical intervention.  Pt instructed to follow up with Dr. KIRAN Joyce (Orthopedic Surgery) upon arrival to [facility].       Consults:   Consults (From admission, onward)        Status Ordering Provider     Inpatient consult to Neurosurgery  Once     Provider:  Adonis Summers MD    Completed YAJAIRA TSE          Final Active Diagnoses:    Diagnosis Date Noted POA    PRINCIPAL PROBLEM:  Closed nondisplaced fracture of second cervical vertebra [S12.101A] 11/27/2018 Yes    Orthostatic hypotension [I95.1] 11/27/2018 Yes    Sick sinus syndrome [I49.5] 04/12/2016 Yes    Neuropathy [G62.9] 01/06/2016 Yes    Inflammatory bowel disease [K52.9] 02/05/2015 Yes    Pulmonary emboli [I26.99] 01/01/2015 Yes      Problems Resolved During this Admission:       Discharged Condition: stable    Disposition: Another Health Care Inst*    Follow Up:  Follow-up Information     Jesus Alberto Joyce MD Today.    Specialty:  Orthopedic Surgery  Why:  -follow upon arrival to [facility] for further evaluation for surgical intervention   Contact information:  [address]  [address]  [facility] [address] [phone]                 Patient Instructions:      Activity as tolerated       Significant Diagnostic Studies: Labs:   CMP   Recent Labs   Lab 11/26/18  2305 11/27/18  0340 11/28/18  0506    138 135*   K 4.6 4.9 5.0    107 105   CO2 18* 20* 23    86 85   BUN 24* 23 18   CREATININE 1.1 1.1  1.1   CALCIUM 9.1 9.1 8.8   PROT 7.1  --   --    ALBUMIN 3.4* 3.3* 3.0*   BILITOT 1.1*  --   --    ALKPHOS 115  --   --    AST 33  --   --    ALT 22  --   --    ANIONGAP 13 11 7*   ESTGFRAFRICA >60 >60 >60   EGFRNONAA >60 >60 >60    and CBC   Recent Labs   Lab 11/26/18  2305 11/27/18  0340 11/28/18  0506   WBC 9.01 9.29 8.40   HGB 14.2 14.3 14.5   HCT 42.0 41.7 43.4    199 203       Pending Diagnostic Studies:     None         Medications:  Reconciled Home Medications:      Medication List      START taking these medications    lisinopril 5 MG tablet  Commonly known as:  PRINIVIL,ZESTRIL  Take 1 tablet (5 mg total) by mouth once daily.  Start taking on:  11/29/2018     sodium chloride 0.65 % nasal spray  Commonly known as:  OCEAN  1 spray by Nasal route as needed.        CONTINUE taking these medications    albuterol 90 mcg/actuation inhaler  Commonly known as:  PROVENTIL/VENTOLIN HFA  Inhale 2 puffs into the lungs every 6 (six) hours.     flecainide 50 MG Tab  Commonly known as:  TAMBOCOR  Take 50 mg by mouth once daily.     FLOMAX 0.4 mg Cap  Generic drug:  tamsulosin  Take 0.4 mg by mouth once daily.     gabapentin 100 MG capsule  Commonly known as:  NEURONTIN  Take 400 mg by mouth 2 (two) times daily.     pantoprazole 40 MG tablet  Commonly known as:  PROTONIX     PHENobarbital 16.2 MG tablet  Commonly known as:  LUMINAL  Take 16.2 mg by mouth 2 (two) times daily.        STOP taking these medications    ELIQUIS 2.5 mg Tab  Generic drug:  apixaban     mesalamine 400 mg Cpdr EC capsule  Commonly known as:  DELZICOL     methylPREDNISolone 4 mg tablet  Commonly known as:  MEDROL DOSEPACK     midodrine 10 MG tablet  Commonly known as:  PROAMATINE            Indwelling Lines/Drains at time of discharge:   Lines/Drains/Airways     Drain                 Urethral Catheter 11/26/18 2309 1 day                Time spent on the discharge of patient: > 30 minutes  Patient was seen and examined on the date of discharge  and determined to be suitable for discharge.         Radha Reza NP  Department of Hospital Medicine  [facility]

## 2018-11-28 NOTE — PLAN OF CARE
Problem: Patient Care Overview  Goal: Plan of Care Review  Outcome: Ongoing (interventions implemented as appropriate)  Dx fx cervical spine  Poc. Instructed on dbc 10 x q1h wa. Instructed on turning with assist q2h to prevent bedsores. instructedon aspen collar and immobilization . Instructed on fall risk and precautions. Oriented to room and call light .pt npo at this time. Instructed on pain scale, meds and management. Call light in reach. Pt awaiting transfer to [facility]. Pt wife notified.

## 2018-11-28 NOTE — PROGRESS NOTES
Pharmacist Intervention IV to PO Note    Viral Ortega Jr. is a 85 y.o. male being treated with IV medication     Patient Data:    Vital Signs (Most Recent):  Temp: 98.9 °F (37.2 °C) (11/28/18 0404)  Pulse: 64 (11/28/18 0404)  Resp: 16 (11/28/18 0404)  BP: (!) 161/92 (11/28/18 0404)  SpO2: 97 % (11/28/18 0404)   Vital Signs (72h Range):  Temp:  [97.1 °F (36.2 °C)-98.9 °F (37.2 °C)]   Pulse:  []   Resp:  [16-21]   BP: (145-182)/()   SpO2:  [94 %-99 %]      CBC:  Recent Labs   Lab 11/26/18 2305 11/27/18  0340   WBC 9.01 9.29   RBC 4.42* 4.40*   HGB 14.2 14.3   HCT 42.0 41.7    199   MCV 95 95   MCH 32.1* 32.5*   MCHC 33.8 34.3     CMP:     Recent Labs   Lab 11/26/18 2305 11/27/18  0340    86   CALCIUM 9.1 9.1   ALBUMIN 3.4* 3.3*   PROT 7.1  --     138   K 4.6 4.9   CO2 18* 20*    107   BUN 24* 23   CREATININE 1.1 1.1   ALKPHOS 115  --    ALT 22  --    AST 33  --    BILITOT 1.1*  --        Dietary Orders:  Diet Orders            Dietary nutrition supplements [facility]; Boost Plus - Chocolate starting at 11/28 0745    Diet Dysphagia Mechanical Soft (IDDSI Level 5) [facility]; Cardiac (Low Na/Chol): Dysphagia 2 (Mechanical Soft Ground) starting at 11/27 1702            Based on the following criteria, this patient qualifies for intravenous to oral conversion:  [x] The patients gastrointestinal tract is functioning (tolerating medications via oral or enteral route for 24 hours and tolerating food or enteral feeds for 24 hours).  [x] The patient is hemodynamically stable for 24 hours (heart rate <100 beats per minute, systolic blood pressure >99 mm Hg, and respiratory rate <20 breaths per minute).  [x] The patient shows clinical improvement (afebrile for at least 24 hours and white blood cell count downtrending or normalized). Additionally, the patient must be non-neutropenic (absolute neutrophil count >500 cells/mm3).  [x] For antimicrobials, the patient has received IV  therapy for at least 24 hours.    IV medication will be changed to oral medication : famotidine 20mg daily    Pharmacist's Name: Rikki Stover  Pharmacist's Extension: [phone]

## 2018-11-28 NOTE — PLAN OF CARE
Problem: Patient Care Overview  Goal: Plan of Care Review  Outcome: Ongoing (interventions implemented as appropriate)  POC reviewed, verbalized understanding. Pt remained free from falls, fall precautions in place. Pt is afib on monitor, 50s-70s. VSS. Complaints of constant headache. PRN and ice packs given while waiting for transfer to [facility]. PIV intact. Call bell and personal belongings within reach. Hourly rounding complete. Reminded to call for assistance. Will continue to monitor.

## 2018-11-28 NOTE — ASSESSMENT & PLAN NOTE
- continue neuro checks q4h  - morphine IV prn pain  - keep NPO for possible surgery  - NSGY recommends MRI C spine and CTA neck  - patient unable to get MRI due to PPM,  - CT C spine results showed interval development of atlanto-axial subluxation with an acute appearing fracture on the left side of the C2 involving the superior articular surface. The fracture also extends through the anterior and posterior walls of the left vertebral canal. Grade 1 retrolisthesis of C3 on C4.  CTA neck results pending

## 2018-11-28 NOTE — SUBJECTIVE & OBJECTIVE
Interval History: pt stable overnight with C-collar in place.  Pt reports neck pain controlled on prescribed medication regime.  Transfer to [facility] initiated at 1345 on 11/27/18 via transfer center. No beds available at this time.  Pt to be accepted by Dr. REINALDO Joyce when beds available.  Hydralazine prn for elevated BP.     Review of Systems   Constitutional: Negative for activity change, appetite change, chills, diaphoresis, fatigue and fever.        S/p fall   HENT: Negative for facial swelling, sore throat, tinnitus and trouble swallowing.         Neck pain   Eyes: Negative for photophobia and visual disturbance.   Respiratory: Negative for apnea, cough, chest tightness, shortness of breath and wheezing.    Cardiovascular: Negative for chest pain, palpitations and leg swelling.   Gastrointestinal: Negative for abdominal distention, abdominal pain, constipation, diarrhea, nausea and vomiting.   Endocrine: Negative for polydipsia, polyphagia and polyuria.   Genitourinary: Negative for decreased urine volume, dysuria, flank pain, frequency and hematuria.   Musculoskeletal: Negative for arthralgias, back pain, joint swelling, myalgias and neck stiffness.   Skin: Negative for pallor and rash.   Allergic/Immunologic: Negative for immunocompromised state.   Neurological: Negative for dizziness, seizures, syncope, weakness, numbness and headaches.   Psychiatric/Behavioral: Negative for confusion, hallucinations and suicidal ideas. The patient is not nervous/anxious.    All other systems reviewed and are negative.    Objective:     Vital Signs (Most Recent):  Temp: 97.1 °F (36.2 °C) (11/27/18 1658)  Pulse: 68 (11/27/18 1801)  Resp: 18 (11/27/18 1658)  BP: (!) 162/82 (11/27/18 1801)  SpO2: 98 % (11/27/18 1658) Vital Signs (24h Range):  Temp:  [97.1 °F (36.2 °C)-98.9 °F (37.2 °C)] 97.1 °F (36.2 °C)  Pulse:  [] 68  Resp:  [16-21] 18  SpO2:  [94 %-99 %] 98 %  BP: (145-182)/() 162/82     Weight: 81.6 kg (180  lb)  Body mass index is 25.83 kg/m².    Intake/Output Summary (Last 24 hours) at 11/27/2018 1801  Last data filed at 11/27/2018 0543  Gross per 24 hour   Intake --   Output 250 ml   Net -250 ml      Physical Exam   Constitutional: He is oriented to person, place, and time. He appears well-developed and well-nourished. No distress.   HENT:   Head: Normocephalic.   Mouth/Throat: Oropharynx is clear and moist.   Eyes: Conjunctivae and EOM are normal. Pupils are equal, round, and reactive to light. No scleral icterus.   Neck: No JVD present. No thyromegaly present.   Neck in aspen collar   Cardiovascular: Normal rate. Exam reveals no gallop and no friction rub.   No murmur heard.  Paced rhythm   Pulmonary/Chest: Effort normal and breath sounds normal. No respiratory distress. He has no wheezes. He has no rales.   Abdominal: Soft. Bowel sounds are normal. He exhibits no distension. There is no tenderness. There is no guarding.   Musculoskeletal: Normal range of motion.   Neurological: He is alert and oriented to person, place, and time. No cranial nerve deficit.   Skin: Skin is warm. Capillary refill takes less than 2 seconds. He is not diaphoretic. No erythema.   Psychiatric: He has a normal mood and affect. Thought content normal.   Nursing note and vitals reviewed.      Significant Labs:   CBC:   Recent Labs   Lab 11/26/18 2305 11/27/18  0340   WBC 9.01 9.29   HGB 14.2 14.3   HCT 42.0 41.7    199     CMP:   Recent Labs   Lab 11/26/18 2305 11/27/18  0340    138   K 4.6 4.9    107   CO2 18* 20*    86   BUN 24* 23   CREATININE 1.1 1.1   CALCIUM 9.1 9.1   PROT 7.1  --    ALBUMIN 3.4* 3.3*   BILITOT 1.1*  --    ALKPHOS 115  --    AST 33  --    ALT 22  --    ANIONGAP 13 11   EGFRNONAA >60 >60       Significant Imaging:   Imaging Results          CT Cervical Spine Without Contrast (Final result)  Result time 11/27/18 07:29:55    Final result by LIONEL Avalos Sr., MD (11/27/18 07:29:55)                  Impression:      1. There has been interval development of atlanto-axial subluxation.  2. There has been interval development of an acute appearing fracture on the left side of the C2 involving the superior articular surface. The fracture also extends through the anterior and posterior walls of the left vertebral canal.  If additional imaging is clinically indicated, I recommend consideration of a CTA of the neck.  3. There is grade 1 retrolisthesis of C3 on C4.  4. The emergency room department is already aware of the above findings and impressions at the time of this dictation.  There were notified by Dr. Nielson at 06:02 on 11/27/2018.  All CT scans at this facility use dose modulation, iterative reconstruction, and/or weight base dosing when appropriate to reduce radiation dose when appropriate to reduce radiation dose to as low as reasonably achievable.      Electronically signed by: Zafar Avalos MD  Date:    11/27/2018  Time:    07:29             Narrative:    EXAMINATION:  CT CERVICAL SPINE WITHOUT CONTRAST    CLINICAL HISTORY:  C-spine trauma, known fracture;    TECHNIQUE:  Standard cervical spine CT protocol was performed without IV contrast.    COMPARISON:  A CT examination of the cervical spine performed on 12/11/2017.    FINDINGS:  There has been interval development of atlanto-axial subluxation.  There has been interval development of an acute appearing fracture on the left side of the C2 involving the superior articular surface.  The fracture also extends through the anterior and posterior walls of the left vertebral canal.  There is grade 1 retrolisthesis of C3 on C4.  There is no scoliosis. There is normal cervical lordosis.  There is a mild amount of atherosclerosis.                               X-Ray Chest AP Portable (Final result)  Result time 11/27/18 00:02:44    Final result by Barrie Castillo III, MD (11/27/18 00:02:44)                 Impression:      Stable chest x-ray.  No  consolidation or effusion.      Electronically signed by: Barrie Castillo MD  Date:    11/27/2018  Time:    00:02             Narrative:    EXAMINATION:  XR CHEST AP PORTABLE    CLINICAL HISTORY:  chest pain;    COMPARISON:  September    FINDINGS:  Normal heart size with mild tortuosity of the thoracic aorta.  Prominent bipolar pacing device noted in position on the left.  Peripheral lungs show no detrimental change.  No consolidation or effusion.

## 2018-11-28 NOTE — PLAN OF CARE
Sw met with pt at bedside to introduce herself. Sw placed contact information on white board. Pt inquired about transfer to [facility]. Sw reports she will f/u and let him know if transfer on today is possible.

## 2018-11-28 NOTE — PROGRESS NOTES
[facility] Hospital Medicine  Progress Note    Patient Name: Viral Ortega Jr.  MRN: [id number]  Patient Class: IP- Inpatient   Admission Date: 11/26/2018  Length of Stay: 0 days  Attending Physician: Alexander Perez MD  Primary Care Provider: Narinder Rivera MD        Subjective:     Principal Problem:Closed nondisplaced fracture of second cervical vertebra    HPI:  85M h/o orthostatic hypotension, NHL, BPH, sick sinus syndrome s/p PPM, and UC transferred from [facility] for neck injury.  He reports he slipped on wet concrete, tried to break his fall with his R arm on the wall but fell back on concrete.  He denies head injury or LOC.  Patient had CT head and neck at [facility] that showed C2 fracture  and transferred to [facility] for neurosurgery services. Symptoms are constant and moderate in severity. No mitigating or exacerbating factors reported. Associated sxs include neck pain. Patient denies any HA, dizziness, CP, abd pain, back pain, hip pain, N/V, extremity weakness/numbness/tingling, and all other sxs at this time. Patient reports cyanosis to bilat feet at night that improve with elevation. No further complaints or concerns at this time.   In ER, [place]. [place] and recommends MRI C spine and CTA neck.  Hospital medicine called for admission.         Hospital Course:  Pt admitted to Telemetry Unit for Closed nondisplaced fracture of second cervical vertebra s/p fall.  Neurosurgery consulted for surgical stabilization.  CT of cervical spine showed interval development of atlanto-axial subluxation with interval development of an acute appearing fracture on the left side of the C2 involving the superior articular surface. The fracture also extends through the anterior and posterior walls of the left vertebral canal. Grade 1 retrolisthesis of C3 on C4 also present.  C-collar in place with analgesia prn continued.  Hydralazine prn for elevated BP.  Transfer to Dr. KIRAN Joyce at [facility]  initiated due to surgical complexity and need for higher level of care.  No beds available for patient transfer at this time.     Interval History: pt stable overnight with C-collar in place.  Pt reports neck pain controlled on prescribed medication regime.  Transfer to [facility] initiated at 1345 on 11/27/18 via transfer center. No beds available at this time.  Pt to be accepted by Dr. REINALDO Joyce when beds available.  Hydralazine prn for elevated BP.     Review of Systems   Constitutional: Negative for activity change, appetite change, chills, diaphoresis, fatigue and fever.        S/p fall   HENT: Negative for facial swelling, sore throat, tinnitus and trouble swallowing.         Neck pain   Eyes: Negative for photophobia and visual disturbance.   Respiratory: Negative for apnea, cough, chest tightness, shortness of breath and wheezing.    Cardiovascular: Negative for chest pain, palpitations and leg swelling.   Gastrointestinal: Negative for abdominal distention, abdominal pain, constipation, diarrhea, nausea and vomiting.   Endocrine: Negative for polydipsia, polyphagia and polyuria.   Genitourinary: Negative for decreased urine volume, dysuria, flank pain, frequency and hematuria.   Musculoskeletal: Negative for arthralgias, back pain, joint swelling, myalgias and neck stiffness.   Skin: Negative for pallor and rash.   Allergic/Immunologic: Negative for immunocompromised state.   Neurological: Negative for dizziness, seizures, syncope, weakness, numbness and headaches.   Psychiatric/Behavioral: Negative for confusion, hallucinations and suicidal ideas. The patient is not nervous/anxious.    All other systems reviewed and are negative.    Objective:     Vital Signs (Most Recent):  Temp: 97.1 °F (36.2 °C) (11/27/18 1658)  Pulse: 68 (11/27/18 1801)  Resp: 18 (11/27/18 1658)  BP: (!) 162/82 (11/27/18 1801)  SpO2: 98 % (11/27/18 1658) Vital Signs (24h Range):  Temp:  [97.1 °F (36.2 °C)-98.9 °F (37.2 °C)] 97.1 °F (36.2  °C)  Pulse:  [] 68  Resp:  [16-21] 18  SpO2:  [94 %-99 %] 98 %  BP: (145-182)/() 162/82     Weight: 81.6 kg (180 lb)  Body mass index is 25.83 kg/m².    Intake/Output Summary (Last 24 hours) at 11/27/2018 1801  Last data filed at 11/27/2018 0543  Gross per 24 hour   Intake --   Output 250 ml   Net -250 ml      Physical Exam   Constitutional: He is oriented to person, place, and time. He appears well-developed and well-nourished. No distress.   HENT:   Head: Normocephalic.   Mouth/Throat: Oropharynx is clear and moist.   Eyes: Conjunctivae and EOM are normal. Pupils are equal, round, and reactive to light. No scleral icterus.   Neck: No JVD present. No thyromegaly present.   Neck in aspen collar   Cardiovascular: Normal rate. Exam reveals no gallop and no friction rub.   No murmur heard.  Paced rhythm   Pulmonary/Chest: Effort normal and breath sounds normal. No respiratory distress. He has no wheezes. He has no rales.   Abdominal: Soft. Bowel sounds are normal. He exhibits no distension. There is no tenderness. There is no guarding.   Musculoskeletal: Normal range of motion.   Neurological: He is alert and oriented to person, place, and time. No cranial nerve deficit.   Skin: Skin is warm. Capillary refill takes less than 2 seconds. He is not diaphoretic. No erythema.   Psychiatric: He has a normal mood and affect. Thought content normal.   Nursing note and vitals reviewed.      Significant Labs:   CBC:   Recent Labs   Lab 11/26/18 2305 11/27/18  0340   WBC 9.01 9.29   HGB 14.2 14.3   HCT 42.0 41.7    199     CMP:   Recent Labs   Lab 11/26/18  2305 11/27/18  0340    138   K 4.6 4.9    107   CO2 18* 20*    86   BUN 24* 23   CREATININE 1.1 1.1   CALCIUM 9.1 9.1   PROT 7.1  --    ALBUMIN 3.4* 3.3*   BILITOT 1.1*  --    ALKPHOS 115  --    AST 33  --    ALT 22  --    ANIONGAP 13 11   EGFRNONAA >60 >60       Significant Imaging:   Imaging Results          CT Cervical Spine Without  Contrast (Final result)  Result time 11/27/18 07:29:55    Final result by LIONEL Avalos Sr., MD (11/27/18 07:29:55)                 Impression:      1. There has been interval development of atlanto-axial subluxation.  2. There has been interval development of an acute appearing fracture on the left side of the C2 involving the superior articular surface. The fracture also extends through the anterior and posterior walls of the left vertebral canal.  If additional imaging is clinically indicated, I recommend consideration of a CTA of the neck.  3. There is grade 1 retrolisthesis of C3 on C4.  4. The emergency room department is already aware of the above findings and impressions at the time of this dictation.  There were notified by Dr. Nielson at 06:02 on 11/27/2018.  All CT scans at this facility use dose modulation, iterative reconstruction, and/or weight base dosing when appropriate to reduce radiation dose when appropriate to reduce radiation dose to as low as reasonably achievable.      Electronically signed by: Zafar Avalos MD  Date:    11/27/2018  Time:    07:29             Narrative:    EXAMINATION:  CT CERVICAL SPINE WITHOUT CONTRAST    CLINICAL HISTORY:  C-spine trauma, known fracture;    TECHNIQUE:  Standard cervical spine CT protocol was performed without IV contrast.    COMPARISON:  A CT examination of the cervical spine performed on 12/11/2017.    FINDINGS:  There has been interval development of atlanto-axial subluxation.  There has been interval development of an acute appearing fracture on the left side of the C2 involving the superior articular surface.  The fracture also extends through the anterior and posterior walls of the left vertebral canal.  There is grade 1 retrolisthesis of C3 on C4.  There is no scoliosis. There is normal cervical lordosis.  There is a mild amount of atherosclerosis.                               X-Ray Chest AP Portable (Final result)  Result time 11/27/18 00:02:44     Final result by Barrie Castillo III, MD (11/27/18 00:02:44)                 Impression:      Stable chest x-ray.  No consolidation or effusion.      Electronically signed by: Barrie Castillo MD  Date:    11/27/2018  Time:    00:02             Narrative:    EXAMINATION:  XR CHEST AP PORTABLE    CLINICAL HISTORY:  chest pain;    COMPARISON:  September    FINDINGS:  Normal heart size with mild tortuosity of the thoracic aorta.  Prominent bipolar pacing device noted in position on the left.  Peripheral lungs show no detrimental change.  No consolidation or effusion.                              Assessment/Plan:      * Closed nondisplaced fracture of second cervical vertebra    - continue neuro checks q4h  - morphine IV prn pain  - keep NPO for possible surgery  - NSGY recommends MRI C spine and CTA neck  - patient unable to get MRI due to PPM,  - CT C spine results showed interval development of atlanto-axial subluxation with an acute appearing fracture on the left side of the C2 involving the superior articular surface. The fracture also extends through the anterior and posterior walls of the left vertebral canal. Grade 1 retrolisthesis of C3 on C4.  CTA neck results pending   -Neurosurgery following  -transfer to [facility] for further evaluation anticipated       Orthostatic hypotension    - patient currently hypertensive while lying down  - currently on morphine prn pain  - will hold midodrine for now         Sick sinus syndrome    - s/p PPM  - continue flecainide for arrhythmia?       Neuropathy    - hold gabapentin       Inflammatory bowel disease    - hold mesalamine       Pulmonary emboli    - hold eliquis for possible surgery           VTE Risk Mitigation (From admission, onward)        Ordered     enoxaparin injection 40 mg  Daily      11/27/18 0318     Place CHIP hose  Until discontinued      11/27/18 0318     Place sequential compression device  Until discontinued      11/27/18 0318     IP VTE HIGH RISK  PATIENT  Once      11/27/18 0318              Radha Reza NP  Department of Hospital Medicine   [facility]

## 2018-11-30 NOTE — PLAN OF CARE
11/30/18 1550   Final Note   Assessment Type Final Discharge Note   Anticipated Discharge Disposition Admitted   Right Care Referral Info   Post Acute Recommendation Other   Facility Name [facility], [place]

## 2019-09-05 ENCOUNTER — HOSPITAL ENCOUNTER (OUTPATIENT)
Dept: RADIOLOGY | Facility: HOSPITAL | Age: 84
Discharge: HOME | End: 2019-09-05
Attending: INTERNAL MEDICINE
Payer: MEDICARE

## 2019-09-05 ENCOUNTER — OFFICE VISIT (OUTPATIENT)
Dept: PULMONOLOGY | Facility: CLINIC | Age: 84
End: 2019-09-05
Payer: MEDICARE

## 2019-09-05 VITALS
HEIGHT: 69 IN | WEIGHT: 172.63 LBS | SYSTOLIC BLOOD PRESSURE: 108 MMHG | OXYGEN SATURATION: 93 % | RESPIRATION RATE: 18 BRPM | BODY MASS INDEX: 25.57 KG/M2 | DIASTOLIC BLOOD PRESSURE: 70 MMHG | HEART RATE: 69 BPM

## 2019-09-05 DIAGNOSIS — R05.9 COUGH: Primary | ICD-10-CM

## 2019-09-05 DIAGNOSIS — R06.02 SOB (SHORTNESS OF BREATH): ICD-10-CM

## 2019-09-05 DIAGNOSIS — J84.10 PULMONARY FIBROSIS, POSTINFLAMMATORY: Primary | ICD-10-CM

## 2019-09-05 DIAGNOSIS — J84.10 PULMONARY FIBROSIS, POSTINFLAMMATORY: ICD-10-CM

## 2019-09-05 DIAGNOSIS — Z86.711 HX PULMONARY EMBOLISM: ICD-10-CM

## 2019-09-05 DIAGNOSIS — I26.99 OTHER PULMONARY EMBOLISM WITHOUT ACUTE COR PULMONALE, UNSPECIFIED CHRONICITY: ICD-10-CM

## 2019-09-05 PROCEDURE — 99214 OFFICE O/P EST MOD 30 MIN: CPT | Mod: 25,S$GLB,, | Performed by: INTERNAL MEDICINE

## 2019-09-05 PROCEDURE — 71046 XR CHEST PA AND LATERAL: ICD-10-PCS | Mod: 26,,, | Performed by: RADIOLOGY

## 2019-09-05 PROCEDURE — 99999 PR PBB SHADOW E&M-EST. PATIENT-LVL III: ICD-10-PCS | Mod: PBBFAC,,, | Performed by: INTERNAL MEDICINE

## 2019-09-05 PROCEDURE — 71046 X-RAY EXAM CHEST 2 VIEWS: CPT | Mod: TC

## 2019-09-05 PROCEDURE — 71046 X-RAY EXAM CHEST 2 VIEWS: CPT | Mod: 26,,, | Performed by: RADIOLOGY

## 2019-09-05 PROCEDURE — 99214 PR OFFICE/OUTPT VISIT, EST, LEVL IV, 30-39 MIN: ICD-10-PCS | Mod: 25,S$GLB,, | Performed by: INTERNAL MEDICINE

## 2019-09-05 PROCEDURE — 99999 PR PBB SHADOW E&M-EST. PATIENT-LVL III: CPT | Mod: PBBFAC,,, | Performed by: INTERNAL MEDICINE

## 2019-09-05 PROCEDURE — 1101F PR PT FALLS ASSESS DOC 0-1 FALLS W/OUT INJ PAST YR: ICD-10-PCS | Mod: CPTII,S$GLB,, | Performed by: INTERNAL MEDICINE

## 2019-09-05 PROCEDURE — 1101F PT FALLS ASSESS-DOCD LE1/YR: CPT | Mod: CPTII,S$GLB,, | Performed by: INTERNAL MEDICINE

## 2019-09-05 NOTE — PROGRESS NOTES
Subjective:       Patient ID: Viral Ortega Jr. is a 86 y.o. male.    Chief Complaint: He       Pulmonary Fibrosis    Pulmonary Fibrosis   Pertinent negatives include no abdominal pain, arthralgias, chest pain, coughing, fatigue, fever, headaches, nausea, rash or vomiting.    Dyspnea  Patient complains of shortness of breath. Symptoms occur after one flight stairs, with one block walking. Symptoms began 10 years ago, gradually worsening since. Associated symptoms include  leg weakness. He denies chest pain, located left chest. He does not have had recent travel. Weight has been stable. Symptoms are exacerbated by moderate activity. Symptoms are alleviated by rest.   C/o of neuropathy in legs    Hx of Pulmonary Embolism :  About 4-5 years ago, now with filter on eliquis - recently stopped Eliquis  Stopped Eliquis on advice of Dr. Mondragon - now on asa 81 mg    Past Medical History:   Diagnosis Date    Anemia     Anticoagulant long-term use     BPH (benign prostatic hyperplasia)     Cancer     Non-hodgkins lymphoma    Colitis     DVT (deep venous thrombosis)     GERD (gastroesophageal reflux disease)     Hypertension     NHL (non-Hodgkin's lymphoma)     Orthostatic hypotension dysautonomic syndrome     PE (pulmonary thromboembolism)     Peripheral neuropathy     Pulmonary fibrosis     Shingles     Sick sinus syndrome     UC (ulcerative colitis)      Past Surgical History:   Procedure Laterality Date    APPENDECTOMY      CARDIAC PACEMAKER PLACEMENT      CARDIAC PACEMAKER PLACEMENT      CATARACT EXTRACTION      COLONOSCOPY      STEFANO FILTER PLACEMENT  Jan . 2015    INGUINAL HERNIA REPAIR Right     KNEE SURGERY Right     MEDIPORT INSERTION, DOUBLE      TONSILLECTOMY       Social History     Socioeconomic History    Marital status:      Spouse name: Not on file    Number of children: Not on file    Years of education: Not on file    Highest education level: Not on file    Occupational History    Not on file   Social Needs    Financial resource strain: Not on file    Food insecurity:     Worry: Not on file     Inability: Not on file    Transportation needs:     Medical: Not on file     Non-medical: Not on file   Tobacco Use    Smoking status: Never Smoker    Smokeless tobacco: Never Used   Substance and Sexual Activity    Alcohol use: No    Drug use: No    Sexual activity: Never   Lifestyle    Physical activity:     Days per week: Not on file     Minutes per session: Not on file    Stress: Not on file   Relationships    Social connections:     Talks on phone: Not on file     Gets together: Not on file     Attends Yarsani service: Not on file     Active member of club or organization: Not on file     Attends meetings of clubs or organizations: Not on file     Relationship status: Not on file   Other Topics Concern    Not on file   Social History Narrative         Review of Systems   Constitutional: Negative for fever and fatigue.   HENT: Negative for postnasal drip and rhinorrhea.    Eyes: Negative for redness and itching.   Respiratory: Positive for shortness of breath. Negative for cough, wheezing, dyspnea on extertion and Paroxysmal Nocturnal Dyspnea.    Cardiovascular: Negative for chest pain.   Genitourinary: Negative for difficulty urinating and hematuria.   Endocrine: Negative for polyphagia, cold intolerance and heat intolerance.    Musculoskeletal: Negative for arthralgias.   Skin: Negative for rash.   Gastrointestinal: Negative for nausea, vomiting, abdominal pain and abdominal distention.   Neurological: Negative for dizziness and headaches.   Hematological: Negative for adenopathy. Does not bruise/bleed easily and no excessive bruising.   Psychiatric/Behavioral: The patient is not nervous/anxious.        Objective:      Physical Exam   Constitutional: He is oriented to person, place, and time. He appears well-developed and well-nourished.   HENT:    Head: Normocephalic and atraumatic.   Mouth/Throat: Oropharyngeal exudate present.   Eyes: Pupils are equal, round, and reactive to light. Conjunctivae are normal.   Neck: Neck supple. No JVD present. No tracheal deviation present. No thyromegaly present.   Cardiovascular: Regular rhythm.  Occasional extrasystoles are present. Bradycardia present.   Murmur heard.   Systolic murmur is present with a grade of 2/6.  Pulmonary/Chest: Effort normal. No respiratory distress. He has decreased breath sounds. He has no wheezes. He has no rhonchi. He has rales in the right lower field and the left lower field. He exhibits no tenderness.   Abdominal: Soft. Bowel sounds are normal.   Musculoskeletal: Normal range of motion. He exhibits no edema or tenderness.   Lymphadenopathy:     He has no cervical adenopathy.   Neurological: He is alert and oriented to person, place, and time.   Skin: Skin is warm and dry.   Nursing note and vitals reviewed.    Personal Diagnostic Review  Chest x-ray: lower lobe fibrosis, Chronic Obstructive Pulmonary Disease   Pulmonary Function Studies: mild decrease DLCO    Office Spirometry Results:     Pulmonary Function Tests 9/5/2019   SpO2 93   Height 69.000   Weight 2761.92   BMI (Calculated) 25.5   Some recent data might be hidden     Pulmonary Studies Review 9/5/2019   SpO2 93   Height 69.000   Weight 2761.92   BMI (Calculated) 25.5   Predicted Distance 247.11   Predicted Distance Meters (Calculated) 448.49         Assessment:       1. Pulmonary fibrosis, postinflammatory    2. SOB (shortness of breath)    3. Other pulmonary embolism without acute cor pulmonale, unspecified chronicity        Outpatient Encounter Medications as of 9/5/2019   Medication Sig Dispense Refill    apixaban (ELIQUIS) 2.5 mg Tab Take 2.5 mg by mouth.      flecainide (TAMBOCOR) 50 MG Tab Take 50 mg by mouth once daily.       gabapentin (NEURONTIN) 100 MG capsule Take 400 mg by mouth 2 (two) times daily.        lisinopril (PRINIVIL,ZESTRIL) 5 MG tablet Take 1 tablet (5 mg total) by mouth once daily. 30 tablet 0    pantoprazole (PROTONIX) 40 MG tablet       sodium chloride (OCEAN) 0.65 % nasal spray 1 spray by Nasal route as needed. 1 Bottle 0    tamsulosin (FLOMAX) 0.4 mg Cp24 Take 0.4 mg by mouth once daily.      [DISCONTINUED] PHENobarbital (LUMINAL) 16.2 MG tablet Take 16.2 mg by mouth 2 (two) times daily.      [DISCONTINUED] albuterol 90 mcg/actuation inhaler Inhale 2 puffs into the lungs every 6 (six) hours. 1 Inhaler 12     No facility-administered encounter medications on file as of 9/5/2019.      No orders of the defined types were placed in this encounter.    Plan:       Requested Prescriptions      No prescriptions requested or ordered in this encounter     Pulmonary fibrosis, postinflammatory    SOB (shortness of breath)    Other pulmonary embolism without acute cor pulmonale, unspecified chronicity           Follow up in about 1 year (around 9/5/2020) for Review CXR, Review susan.    MEDICAL DECISION MAKING: Moderate to high complexity.  Overall, the multiple problems listed are of moderate to high severity that may impact quality of life and activities of daily living. Side effects of medications, treatment plan as well as options and alternatives reviewed and discussed with patient. There was counseling of patient concerning these issues.    Total time spent in face to face counseling and coordination of care - 25  minutes over 50% of time was used in discussion of prognosis, risks, benefits of treatment, instructions and compliance with regimen . Discussion with other physicians or health care providers (DME, NP, pharmacy, respiratory therapy) occurred.

## 2020-01-17 ENCOUNTER — TELEPHONE (OUTPATIENT)
Dept: PULMONOLOGY | Facility: CLINIC | Age: 85
End: 2020-01-17

## 2020-01-17 NOTE — TELEPHONE ENCOUNTER
----- Message from Edwina Rhodes sent at 1/17/2020  1:36 PM CST -----  Contact: Patient  Patient need a hospital follow up with Dr. Tubbs, he was released on 01/16/2020. Please call to work patient in the next available was 02/18/2020. Ph .[phone] (home)

## 2020-08-21 DIAGNOSIS — R05.9 COUGH: Primary | ICD-10-CM

## 2020-08-26 ENCOUNTER — TELEPHONE (OUTPATIENT)
Dept: PULMONOLOGY | Facility: CLINIC | Age: 85
End: 2020-08-26

## 2020-08-26 NOTE — TELEPHONE ENCOUNTER
Called pt to schedule pre-procedural Covid-19 testing, 72 hours prior to Spirometry. Called both home and mobile numbers. No answer, unable to leave a message(no voicemail setup for mobile).

## 2020-08-27 ENCOUNTER — TELEPHONE (OUTPATIENT)
Dept: PULMONOLOGY | Facility: CLINIC | Age: 85
End: 2020-08-27

## 2020-08-27 NOTE — TELEPHONE ENCOUNTER
Called pt to schedule pre-procedural Covid-19 testing, 72 hours prior to Spirometry. No answer, unable to leave a message at home or mobile numbers.

## 2020-08-28 ENCOUNTER — TELEPHONE (OUTPATIENT)
Dept: PULMONOLOGY | Facility: CLINIC | Age: 85
End: 2020-08-28

## 2020-08-31 ENCOUNTER — TELEPHONE (OUTPATIENT)
Dept: PULMONOLOGY | Facility: CLINIC | Age: 85
End: 2020-08-31

## 2021-10-07 NOTE — ED NOTES
LOC: The patient is awake, alert and aware of environment with an appropriate affect, the patient is oriented x 3 and speaking appropriately.  APPEARANCE: Patient resting comfortably and in no acute distress, patient is clean and well groomed, patient's clothing is properly fastened.  HEENT: Brief WNL except pt reports painful swallowing since Sunday. H/o esophageal tear   SKIN: Brief WNL.   MUSCULOSKELETAL: Brief WNL  RESPIRATORY: Brief WNL  CARDIAC: Brief WNL except pacemaker   GASTRO: Brief WNL   : Brief WNL  Peripheral Vasc: Brief WNL except bilateral compression stocking in place, not removed   NEURO: Brief WNL  PSYCH: Brief WNL   Valtrex Counseling: I discussed with the patient the risks of valacyclovir including but not limited to kidney damage, nausea, vomiting and severe allergy.  The patient understands that if the infection seems to be worsening or is not improving, they are to call.